# Patient Record
Sex: FEMALE | Race: WHITE | Employment: UNEMPLOYED | URBAN - METROPOLITAN AREA
[De-identification: names, ages, dates, MRNs, and addresses within clinical notes are randomized per-mention and may not be internally consistent; named-entity substitution may affect disease eponyms.]

---

## 2019-03-05 ENCOUNTER — HOSPITAL ENCOUNTER (OUTPATIENT)
Dept: PREADMISSION TESTING | Age: 64
Discharge: HOME | End: 2019-03-09
Payer: MEDICARE

## 2019-03-05 ENCOUNTER — HOSPITAL ENCOUNTER (OUTPATIENT)
Age: 64
Discharge: HOME | End: 2019-03-09
Payer: COMMERCIAL

## 2019-03-05 LAB
ABO/RH: NORMAL
ALBUMIN SERPL-MCNC: 4.4 G/DL (ref 3.4–5)
AMORPHOUS: ABNORMAL /HPF
ANION GAP SERPL CALCULATED.3IONS-SCNC: 16 MMOL/L (ref 3–16)
ANTIBODY SCREEN: NORMAL
APTT: 35.8 SEC (ref 26–36)
BACTERIA: ABNORMAL /HPF
BASOPHILS ABSOLUTE: 0 K/UL (ref 0–0.2)
BASOPHILS RELATIVE PERCENT: 0.4 %
BILIRUBIN URINE: ABNORMAL
BLOOD, URINE: ABNORMAL
BUN BLDV-MCNC: 14 MG/DL (ref 7–20)
CALCIUM SERPL-MCNC: 9.4 MG/DL (ref 8.3–10.6)
CHLORIDE BLD-SCNC: 100 MMOL/L (ref 99–110)
CLARITY: CLEAR
CO2: 25 MMOL/L (ref 21–32)
COLOR: YELLOW
CREAT SERPL-MCNC: 0.8 MG/DL (ref 0.6–1.2)
EOSINOPHILS ABSOLUTE: 0.1 K/UL (ref 0–0.6)
EOSINOPHILS RELATIVE PERCENT: 2.3 %
EPITHELIAL CELLS, UA: ABNORMAL /HPF
GFR AFRICAN AMERICAN: >60
GFR NON-AFRICAN AMERICAN: >60
GLUCOSE BLD-MCNC: 103 MG/DL (ref 70–99)
GLUCOSE URINE: NEGATIVE MG/DL
HCT VFR BLD CALC: 33.7 % (ref 36–48)
HEMOGLOBIN: 11 G/DL (ref 12–16)
INR BLD: 1.02 (ref 0.86–1.14)
KETONES, URINE: NEGATIVE MG/DL
LEUKOCYTE ESTERASE, URINE: ABNORMAL
LYMPHOCYTES ABSOLUTE: 1.2 K/UL (ref 1–5.1)
LYMPHOCYTES RELATIVE PERCENT: 24.4 %
MCH RBC QN AUTO: 31.3 PG (ref 26–34)
MCHC RBC AUTO-ENTMCNC: 32.6 G/DL (ref 31–36)
MCV RBC AUTO: 96.1 FL (ref 80–100)
MICROSCOPIC EXAMINATION: YES
MONOCYTES ABSOLUTE: 0.3 K/UL (ref 0–1.3)
MONOCYTES RELATIVE PERCENT: 6.9 %
NEUTROPHILS ABSOLUTE: 3.3 K/UL (ref 1.7–7.7)
NEUTROPHILS RELATIVE PERCENT: 66 %
NITRITE, URINE: NEGATIVE
PDW BLD-RTO: 17 % (ref 12.4–15.4)
PH UA: 5.5 (ref 5–8)
PLATELET # BLD: 300 K/UL (ref 135–450)
PMV BLD AUTO: 7.3 FL (ref 5–10.5)
POTASSIUM SERPL-SCNC: 3.6 MMOL/L (ref 3.5–5.1)
PROTEIN UA: 100 MG/DL
PROTHROMBIN TIME: 11.6 SEC (ref 9.8–13)
RBC # BLD: 3.5 M/UL (ref 4–5.2)
RBC UA: ABNORMAL /HPF (ref 0–2)
SODIUM BLD-SCNC: 141 MMOL/L (ref 136–145)
SPECIFIC GRAVITY UA: >=1.03 (ref 1–1.03)
URINE TYPE: ABNORMAL
UROBILINOGEN, URINE: 1 E.U./DL
WBC # BLD: 5 K/UL (ref 4–11)
WBC UA: ABNORMAL /HPF (ref 0–5)

## 2019-03-05 PROCEDURE — 86900 BLOOD TYPING SEROLOGIC ABO: CPT

## 2019-03-05 PROCEDURE — 81001 URINALYSIS AUTO W/SCOPE: CPT

## 2019-03-05 PROCEDURE — 86901 BLOOD TYPING SEROLOGIC RH(D): CPT

## 2019-03-05 PROCEDURE — 86850 RBC ANTIBODY SCREEN: CPT

## 2019-03-05 PROCEDURE — 93005 ELECTROCARDIOGRAM TRACING: CPT | Performed by: ORTHOPAEDIC SURGERY

## 2019-03-05 PROCEDURE — 82040 ASSAY OF SERUM ALBUMIN: CPT

## 2019-03-05 PROCEDURE — 87086 URINE CULTURE/COLONY COUNT: CPT

## 2019-03-05 PROCEDURE — 85730 THROMBOPLASTIN TIME PARTIAL: CPT

## 2019-03-05 PROCEDURE — 36415 COLL VENOUS BLD VENIPUNCTURE: CPT

## 2019-03-05 PROCEDURE — 83036 HEMOGLOBIN GLYCOSYLATED A1C: CPT

## 2019-03-05 PROCEDURE — 80048 BASIC METABOLIC PNL TOTAL CA: CPT

## 2019-03-05 PROCEDURE — 85025 COMPLETE CBC W/AUTO DIFF WBC: CPT

## 2019-03-05 PROCEDURE — 85610 PROTHROMBIN TIME: CPT

## 2019-03-06 LAB
EKG ATRIAL RATE: 74 BPM
EKG DIAGNOSIS: NORMAL
EKG P AXIS: 31 DEGREES
EKG P-R INTERVAL: 172 MS
EKG Q-T INTERVAL: 378 MS
EKG QRS DURATION: 90 MS
EKG QTC CALCULATION (BAZETT): 419 MS
EKG R AXIS: 21 DEGREES
EKG T AXIS: 30 DEGREES
EKG VENTRICULAR RATE: 74 BPM
ESTIMATED AVERAGE GLUCOSE: 105.4 MG/DL
HBA1C MFR BLD: 5.3 %

## 2019-03-06 PROCEDURE — 93010 ELECTROCARDIOGRAM REPORT: CPT | Performed by: INTERNAL MEDICINE

## 2019-03-07 LAB — URINE CULTURE, ROUTINE: NORMAL

## 2019-03-18 NOTE — PROGRESS NOTES
Obstructive Sleep Apnea (ANG) Screening     Patient:  Chantal Edge    YOB: 1955      Medical Record #:  [id number]                     Date:  3/18/2019     1. Are you a loud and/or regular snorer? []  Yes       [x] No    2. Have you been observed to gasp or stop breathing during sleep? []  Yes       [x] No    3. Do you feel tired or groggy upon awakening or do you awaken with a headache?           []  Yes       [x] No    4. Are you often tired or fatigued during the wake time hours? []  Yes       [x] No    5. Do you fall asleep sitting, reading, watching TV or driving? []  Yes       [x] No    6. Do you often have problems with memory or concentration? []  Yes       [x] No    **If patient's score is ? 3 they are considered high risk for ANG. Notify the anesthesiologist of the high risk and document in focus note. Note:  If the patient's BMI is more than 35 kg m¯² , has neck circumference > 40 cm, and/or high blood pressure the risk is greater (© American Sleep [employer]nea Association, 2006).

## 2019-03-22 ENCOUNTER — ANESTHESIA EVENT (OUTPATIENT)
Dept: OPERATING ROOM | Age: 64
DRG: 470 | End: 2019-03-22
Payer: COMMERCIAL

## 2019-03-25 ENCOUNTER — APPOINTMENT (OUTPATIENT)
Dept: GENERAL RADIOLOGY | Age: 64
DRG: 470 | End: 2019-03-25
Attending: ORTHOPAEDIC SURGERY
Payer: COMMERCIAL

## 2019-03-25 ENCOUNTER — HOSPITAL ENCOUNTER (INPATIENT)
Age: 64
LOS: 2 days | Discharge: HOME HEALTH | DRG: 470 | End: 2019-03-27
Attending: ORTHOPAEDIC SURGERY | Admitting: ORTHOPAEDIC SURGERY
Payer: COMMERCIAL

## 2019-03-25 ENCOUNTER — ANESTHESIA (OUTPATIENT)
Dept: OPERATING ROOM | Age: 64
DRG: 470 | End: 2019-03-25
Payer: COMMERCIAL

## 2019-03-25 VITALS
RESPIRATION RATE: 14 BRPM | SYSTOLIC BLOOD PRESSURE: 123 MMHG | TEMPERATURE: 97.1 F | DIASTOLIC BLOOD PRESSURE: 77 MMHG | OXYGEN SATURATION: 100 %

## 2019-03-25 DIAGNOSIS — Z98.890 STATUS POST HIP SURGERY: Primary | ICD-10-CM

## 2019-03-25 DIAGNOSIS — M84.451A PATHOLOGIC FRACTURE OF FEMORAL NECK, RIGHT, INITIAL ENCOUNTER (HCC): ICD-10-CM

## 2019-03-25 LAB
ABO/RH: NORMAL
ANTIBODY SCREEN: NORMAL

## 2019-03-25 PROCEDURE — 3600000014 HC SURGERY LEVEL 4 ADDTL 15MIN: Performed by: ORTHOPAEDIC SURGERY

## 2019-03-25 PROCEDURE — C1776 JOINT DEVICE (IMPLANTABLE): HCPCS | Performed by: ORTHOPAEDIC SURGERY

## 2019-03-25 PROCEDURE — 6360000002 HC RX W HCPCS: Performed by: ANESTHESIOLOGY

## 2019-03-25 PROCEDURE — 73501 X-RAY EXAM HIP UNI 1 VIEW: CPT

## 2019-03-25 PROCEDURE — 2720000010 HC SURG SUPPLY STERILE: Performed by: ORTHOPAEDIC SURGERY

## 2019-03-25 PROCEDURE — 86850 RBC ANTIBODY SCREEN: CPT

## 2019-03-25 PROCEDURE — 6360000002 HC RX W HCPCS: Performed by: ORTHOPAEDIC SURGERY

## 2019-03-25 PROCEDURE — 2500000003 HC RX 250 WO HCPCS: Performed by: ORTHOPAEDIC SURGERY

## 2019-03-25 PROCEDURE — 6360000002 HC RX W HCPCS: Performed by: NURSE ANESTHETIST, CERTIFIED REGISTERED

## 2019-03-25 PROCEDURE — 6370000000 HC RX 637 (ALT 250 FOR IP): Performed by: ORTHOPAEDIC SURGERY

## 2019-03-25 PROCEDURE — 88311 DECALCIFY TISSUE: CPT

## 2019-03-25 PROCEDURE — 76942 ECHO GUIDE FOR BIOPSY: CPT | Performed by: ANESTHESIOLOGY

## 2019-03-25 PROCEDURE — 2500000003 HC RX 250 WO HCPCS: Performed by: NURSE ANESTHETIST, CERTIFIED REGISTERED

## 2019-03-25 PROCEDURE — 6370000000 HC RX 637 (ALT 250 FOR IP): Performed by: ANESTHESIOLOGY

## 2019-03-25 PROCEDURE — 2700000000 HC OXYGEN THERAPY PER DAY

## 2019-03-25 PROCEDURE — 2500000003 HC RX 250 WO HCPCS: Performed by: ANESTHESIOLOGY

## 2019-03-25 PROCEDURE — 2580000003 HC RX 258: Performed by: ANESTHESIOLOGY

## 2019-03-25 PROCEDURE — 3700000001 HC ADD 15 MINUTES (ANESTHESIA): Performed by: ORTHOPAEDIC SURGERY

## 2019-03-25 PROCEDURE — 2580000003 HC RX 258: Performed by: ORTHOPAEDIC SURGERY

## 2019-03-25 PROCEDURE — 88305 TISSUE EXAM BY PATHOLOGIST: CPT

## 2019-03-25 PROCEDURE — 3E0T3BZ INTRODUCTION OF ANESTHETIC AGENT INTO PERIPHERAL NERVES AND PLEXI, PERCUTANEOUS APPROACH: ICD-10-PCS | Performed by: ANESTHESIOLOGY

## 2019-03-25 PROCEDURE — 94761 N-INVAS EAR/PLS OXIMETRY MLT: CPT

## 2019-03-25 PROCEDURE — 7100000000 HC PACU RECOVERY - FIRST 15 MIN: Performed by: ORTHOPAEDIC SURGERY

## 2019-03-25 PROCEDURE — 86900 BLOOD TYPING SEROLOGIC ABO: CPT

## 2019-03-25 PROCEDURE — 6370000000 HC RX 637 (ALT 250 FOR IP): Performed by: NURSE PRACTITIONER

## 2019-03-25 PROCEDURE — 2709999900 HC NON-CHARGEABLE SUPPLY: Performed by: ORTHOPAEDIC SURGERY

## 2019-03-25 PROCEDURE — 1200000000 HC SEMI PRIVATE

## 2019-03-25 PROCEDURE — 0SRR019 REPLACEMENT OF RIGHT HIP JOINT, FEMORAL SURFACE WITH METAL SYNTHETIC SUBSTITUTE, CEMENTED, OPEN APPROACH: ICD-10-PCS | Performed by: ORTHOPAEDIC SURGERY

## 2019-03-25 PROCEDURE — 7100000001 HC PACU RECOVERY - ADDTL 15 MIN: Performed by: ORTHOPAEDIC SURGERY

## 2019-03-25 PROCEDURE — 3600000004 HC SURGERY LEVEL 4 BASE: Performed by: ORTHOPAEDIC SURGERY

## 2019-03-25 PROCEDURE — 3700000000 HC ANESTHESIA ATTENDED CARE: Performed by: ORTHOPAEDIC SURGERY

## 2019-03-25 PROCEDURE — 86901 BLOOD TYPING SEROLOGIC RH(D): CPT

## 2019-03-25 PROCEDURE — C1713 ANCHOR/SCREW BN/BN,TIS/BN: HCPCS | Performed by: ORTHOPAEDIC SURGERY

## 2019-03-25 DEVICE — HEAD FEM DIA28MM NK L+0MM HIP CO CHROM CEM PRI PRESSFIT: Type: IMPLANTABLE DEVICE | Site: HIP | Status: FUNCTIONAL

## 2019-03-25 DEVICE — CEMENT BNE 40 GM RADIOPAQUE BA SIMPLEX P: Type: IMPLANTABLE DEVICE | Site: HIP | Status: FUNCTIONAL

## 2019-03-25 DEVICE — VERSYS CEM/REV/CALCAR 13X170MM: Type: IMPLANTABLE DEVICE | Site: HIP | Status: FUNCTIONAL

## 2019-03-25 DEVICE — BIPOLAR LINER 44/45/46MM OD X 28MM ID: Type: IMPLANTABLE DEVICE | Site: HIP | Status: FUNCTIONAL

## 2019-03-25 DEVICE — VERSYS DISTAL CENTRALIZER 11MM: Type: IMPLANTABLE DEVICE | Site: HIP | Status: FUNCTIONAL

## 2019-03-25 DEVICE — BIPOLAR SHELL 44MM OD: Type: IMPLANTABLE DEVICE | Site: HIP | Status: FUNCTIONAL

## 2019-03-25 RX ORDER — LABETALOL HYDROCHLORIDE 5 MG/ML
5 INJECTION, SOLUTION INTRAVENOUS EVERY 10 MIN PRN
Status: DISCONTINUED | OUTPATIENT
Start: 2019-03-25 | End: 2019-03-25 | Stop reason: HOSPADM

## 2019-03-25 RX ORDER — CELECOXIB 100 MG/1
100 CAPSULE ORAL 2 TIMES DAILY
Status: DISCONTINUED | OUTPATIENT
Start: 2019-03-25 | End: 2019-03-27 | Stop reason: HOSPADM

## 2019-03-25 RX ORDER — ACETAMINOPHEN 10 MG/ML
INJECTION, SOLUTION INTRAVENOUS
Status: DISPENSED
Start: 2019-03-25 | End: 2019-03-26

## 2019-03-25 RX ORDER — CELECOXIB 100 MG/1
200 CAPSULE ORAL ONCE
Status: COMPLETED | OUTPATIENT
Start: 2019-03-25 | End: 2019-03-25

## 2019-03-25 RX ORDER — PROMETHAZINE HYDROCHLORIDE 25 MG/ML
6.25 INJECTION, SOLUTION INTRAMUSCULAR; INTRAVENOUS
Status: DISCONTINUED | OUTPATIENT
Start: 2019-03-25 | End: 2019-03-25 | Stop reason: HOSPADM

## 2019-03-25 RX ORDER — OXYCODONE HYDROCHLORIDE AND ACETAMINOPHEN 5; 325 MG/1; MG/1
2 TABLET ORAL EVERY 4 HOURS PRN
Status: DISCONTINUED | OUTPATIENT
Start: 2019-03-25 | End: 2019-03-27 | Stop reason: HOSPADM

## 2019-03-25 RX ORDER — LIDOCAINE HYDROCHLORIDE 10 MG/ML
1 INJECTION, SOLUTION EPIDURAL; INFILTRATION; INTRACAUDAL; PERINEURAL
Status: DISCONTINUED | OUTPATIENT
Start: 2019-03-25 | End: 2019-03-25 | Stop reason: HOSPADM

## 2019-03-25 RX ORDER — SODIUM CHLORIDE, SODIUM LACTATE, POTASSIUM CHLORIDE, CALCIUM CHLORIDE 600; 310; 30; 20 MG/100ML; MG/100ML; MG/100ML; MG/100ML
INJECTION, SOLUTION INTRAVENOUS CONTINUOUS
Status: DISCONTINUED | OUTPATIENT
Start: 2019-03-25 | End: 2019-03-25

## 2019-03-25 RX ORDER — MORPHINE SULFATE 4 MG/ML
1 INJECTION, SOLUTION INTRAMUSCULAR; INTRAVENOUS EVERY 5 MIN PRN
Status: DISCONTINUED | OUTPATIENT
Start: 2019-03-25 | End: 2019-03-25 | Stop reason: HOSPADM

## 2019-03-25 RX ORDER — FENTANYL CITRATE 50 UG/ML
INJECTION, SOLUTION INTRAMUSCULAR; INTRAVENOUS PRN
Status: DISCONTINUED | OUTPATIENT
Start: 2019-03-25 | End: 2019-03-25 | Stop reason: SDUPTHER

## 2019-03-25 RX ORDER — ACETAMINOPHEN 10 MG/ML
INJECTION, SOLUTION INTRAVENOUS CONTINUOUS PRN
Status: COMPLETED | OUTPATIENT
Start: 2019-03-25 | End: 2019-03-25

## 2019-03-25 RX ORDER — ONDANSETRON 2 MG/ML
4 INJECTION INTRAMUSCULAR; INTRAVENOUS
Status: DISCONTINUED | OUTPATIENT
Start: 2019-03-25 | End: 2019-03-25 | Stop reason: HOSPADM

## 2019-03-25 RX ORDER — MIDAZOLAM HYDROCHLORIDE 1 MG/ML
INJECTION INTRAMUSCULAR; INTRAVENOUS PRN
Status: DISCONTINUED | OUTPATIENT
Start: 2019-03-25 | End: 2019-03-25 | Stop reason: SDUPTHER

## 2019-03-25 RX ORDER — ROCURONIUM BROMIDE 10 MG/ML
INJECTION, SOLUTION INTRAVENOUS PRN
Status: DISCONTINUED | OUTPATIENT
Start: 2019-03-25 | End: 2019-03-25 | Stop reason: SDUPTHER

## 2019-03-25 RX ORDER — HYDROMORPHONE HCL 110MG/55ML
PATIENT CONTROLLED ANALGESIA SYRINGE INTRAVENOUS PRN
Status: DISCONTINUED | OUTPATIENT
Start: 2019-03-25 | End: 2019-03-25 | Stop reason: SDUPTHER

## 2019-03-25 RX ORDER — TRANEXAMIC ACID 100 MG/ML
15 INJECTION, SOLUTION INTRAVENOUS ONCE
Status: COMPLETED | OUTPATIENT
Start: 2019-03-25 | End: 2019-03-25

## 2019-03-25 RX ORDER — CYCLOBENZAPRINE HCL 10 MG
10 TABLET ORAL 3 TIMES DAILY PRN
Status: DISCONTINUED | OUTPATIENT
Start: 2019-03-25 | End: 2019-03-27 | Stop reason: HOSPADM

## 2019-03-25 RX ORDER — BUPIVACAINE HYDROCHLORIDE 2.5 MG/ML
INJECTION, SOLUTION EPIDURAL; INFILTRATION; INTRACAUDAL PRN
Status: DISCONTINUED | OUTPATIENT
Start: 2019-03-25 | End: 2019-03-25 | Stop reason: SDUPTHER

## 2019-03-25 RX ORDER — ONDANSETRON 2 MG/ML
INJECTION INTRAMUSCULAR; INTRAVENOUS PRN
Status: DISCONTINUED | OUTPATIENT
Start: 2019-03-25 | End: 2019-03-25 | Stop reason: SDUPTHER

## 2019-03-25 RX ORDER — DEXTROSE, SODIUM CHLORIDE, AND POTASSIUM CHLORIDE 5; .45; .15 G/100ML; G/100ML; G/100ML
1000 INJECTION INTRAVENOUS CONTINUOUS
Status: DISCONTINUED | OUTPATIENT
Start: 2019-03-25 | End: 2019-03-27 | Stop reason: HOSPADM

## 2019-03-25 RX ORDER — ACETAMINOPHEN 500 MG
1000 TABLET ORAL ONCE
Status: DISCONTINUED | OUTPATIENT
Start: 2019-03-25 | End: 2019-03-25 | Stop reason: HOSPADM

## 2019-03-25 RX ORDER — OXYCODONE HYDROCHLORIDE AND ACETAMINOPHEN 5; 325 MG/1; MG/1
1 TABLET ORAL PRN
Status: DISCONTINUED | OUTPATIENT
Start: 2019-03-25 | End: 2019-03-25 | Stop reason: HOSPADM

## 2019-03-25 RX ORDER — HYDRALAZINE HYDROCHLORIDE 20 MG/ML
5 INJECTION INTRAMUSCULAR; INTRAVENOUS EVERY 10 MIN PRN
Status: DISCONTINUED | OUTPATIENT
Start: 2019-03-25 | End: 2019-03-25 | Stop reason: HOSPADM

## 2019-03-25 RX ORDER — DIPHENHYDRAMINE HCL 25 MG
25 TABLET ORAL EVERY 6 HOURS PRN
Status: DISCONTINUED | OUTPATIENT
Start: 2019-03-25 | End: 2019-03-27 | Stop reason: HOSPADM

## 2019-03-25 RX ORDER — SODIUM CHLORIDE 0.9 % (FLUSH) 0.9 %
10 SYRINGE (ML) INJECTION PRN
Status: DISCONTINUED | OUTPATIENT
Start: 2019-03-25 | End: 2019-03-25 | Stop reason: HOSPADM

## 2019-03-25 RX ORDER — CYCLOBENZAPRINE HCL 10 MG
10 TABLET ORAL ONCE
Status: COMPLETED | OUTPATIENT
Start: 2019-03-25 | End: 2019-03-25

## 2019-03-25 RX ORDER — SODIUM CHLORIDE 0.9 % (FLUSH) 0.9 %
10 SYRINGE (ML) INJECTION PRN
Status: DISCONTINUED | OUTPATIENT
Start: 2019-03-25 | End: 2019-03-27 | Stop reason: HOSPADM

## 2019-03-25 RX ORDER — ONDANSETRON 2 MG/ML
4 INJECTION INTRAMUSCULAR; INTRAVENOUS EVERY 6 HOURS PRN
Status: DISCONTINUED | OUTPATIENT
Start: 2019-03-25 | End: 2019-03-27 | Stop reason: HOSPADM

## 2019-03-25 RX ORDER — SODIUM CHLORIDE 0.9 % (FLUSH) 0.9 %
10 SYRINGE (ML) INJECTION EVERY 12 HOURS SCHEDULED
Status: DISCONTINUED | OUTPATIENT
Start: 2019-03-25 | End: 2019-03-27 | Stop reason: HOSPADM

## 2019-03-25 RX ORDER — LIDOCAINE HYDROCHLORIDE 20 MG/ML
INJECTION, SOLUTION INFILTRATION; PERINEURAL PRN
Status: DISCONTINUED | OUTPATIENT
Start: 2019-03-25 | End: 2019-03-25 | Stop reason: SDUPTHER

## 2019-03-25 RX ORDER — DIPHENHYDRAMINE HYDROCHLORIDE 50 MG/ML
12.5 INJECTION INTRAMUSCULAR; INTRAVENOUS
Status: DISCONTINUED | OUTPATIENT
Start: 2019-03-25 | End: 2019-03-25 | Stop reason: HOSPADM

## 2019-03-25 RX ORDER — SODIUM CHLORIDE 0.9 % (FLUSH) 0.9 %
10 SYRINGE (ML) INJECTION EVERY 12 HOURS SCHEDULED
Status: DISCONTINUED | OUTPATIENT
Start: 2019-03-25 | End: 2019-03-25 | Stop reason: HOSPADM

## 2019-03-25 RX ORDER — OXYCODONE HYDROCHLORIDE AND ACETAMINOPHEN 5; 325 MG/1; MG/1
1 TABLET ORAL EVERY 4 HOURS PRN
Status: DISCONTINUED | OUTPATIENT
Start: 2019-03-25 | End: 2019-03-27 | Stop reason: HOSPADM

## 2019-03-25 RX ORDER — PROMETHAZINE HYDROCHLORIDE 12.5 MG/1
12.5 TABLET ORAL
Status: ON HOLD | COMMUNITY
Start: 2018-08-23 | End: 2019-03-27 | Stop reason: HOSPADM

## 2019-03-25 RX ORDER — MORPHINE SULFATE 4 MG/ML
2 INJECTION, SOLUTION INTRAMUSCULAR; INTRAVENOUS EVERY 5 MIN PRN
Status: DISCONTINUED | OUTPATIENT
Start: 2019-03-25 | End: 2019-03-25 | Stop reason: HOSPADM

## 2019-03-25 RX ORDER — MEPERIDINE HYDROCHLORIDE 50 MG/ML
12.5 INJECTION INTRAMUSCULAR; INTRAVENOUS; SUBCUTANEOUS EVERY 5 MIN PRN
Status: DISCONTINUED | OUTPATIENT
Start: 2019-03-25 | End: 2019-03-25 | Stop reason: HOSPADM

## 2019-03-25 RX ORDER — DEXAMETHASONE SODIUM PHOSPHATE 4 MG/ML
INJECTION, SOLUTION INTRA-ARTICULAR; INTRALESIONAL; INTRAMUSCULAR; INTRAVENOUS; SOFT TISSUE PRN
Status: DISCONTINUED | OUTPATIENT
Start: 2019-03-25 | End: 2019-03-25 | Stop reason: SDUPTHER

## 2019-03-25 RX ORDER — DOCUSATE SODIUM 100 MG/1
100 CAPSULE, LIQUID FILLED ORAL 2 TIMES DAILY
Status: DISCONTINUED | OUTPATIENT
Start: 2019-03-25 | End: 2019-03-27 | Stop reason: HOSPADM

## 2019-03-25 RX ORDER — OXYCODONE HYDROCHLORIDE AND ACETAMINOPHEN 5; 325 MG/1; MG/1
2 TABLET ORAL PRN
Status: DISCONTINUED | OUTPATIENT
Start: 2019-03-25 | End: 2019-03-25 | Stop reason: HOSPADM

## 2019-03-25 RX ORDER — PROMETHAZINE HYDROCHLORIDE 12.5 MG/1
12.5 SUPPOSITORY RECTAL
Status: ON HOLD | COMMUNITY
Start: 2018-07-02 | End: 2019-03-27 | Stop reason: HOSPADM

## 2019-03-25 RX ORDER — PROPOFOL 10 MG/ML
INJECTION, EMULSION INTRAVENOUS PRN
Status: DISCONTINUED | OUTPATIENT
Start: 2019-03-25 | End: 2019-03-25 | Stop reason: SDUPTHER

## 2019-03-25 RX ADMIN — ROCURONIUM BROMIDE 50 MG: 10 SOLUTION INTRAVENOUS at 12:38

## 2019-03-25 RX ADMIN — Medication 10 ML: at 20:55

## 2019-03-25 RX ADMIN — TRANEXAMIC ACID 1000 MG: 100 INJECTION, SOLUTION INTRAVENOUS at 12:46

## 2019-03-25 RX ADMIN — ONDANSETRON 4 MG: 2 INJECTION INTRAMUSCULAR; INTRAVENOUS at 13:13

## 2019-03-25 RX ADMIN — HYDROMORPHONE HYDROCHLORIDE 0.25 MG: 1 INJECTION, SOLUTION INTRAMUSCULAR; INTRAVENOUS; SUBCUTANEOUS at 14:38

## 2019-03-25 RX ADMIN — DEXAMETHASONE SODIUM PHOSPHATE 8 MG: 4 INJECTION, SOLUTION INTRAMUSCULAR; INTRAVENOUS at 13:13

## 2019-03-25 RX ADMIN — LIDOCAINE HYDROCHLORIDE 60 MG: 20 INJECTION, SOLUTION INFILTRATION; PERINEURAL at 12:38

## 2019-03-25 RX ADMIN — HYDROMORPHONE HYDROCHLORIDE 1 MG: 2 INJECTION INTRAMUSCULAR; INTRAVENOUS; SUBCUTANEOUS at 13:07

## 2019-03-25 RX ADMIN — SODIUM CHLORIDE, POTASSIUM CHLORIDE, SODIUM LACTATE AND CALCIUM CHLORIDE: 600; 310; 30; 20 INJECTION, SOLUTION INTRAVENOUS at 11:54

## 2019-03-25 RX ADMIN — CELECOXIB 200 MG: 100 CAPSULE ORAL at 11:53

## 2019-03-25 RX ADMIN — Medication 2 G: at 20:40

## 2019-03-25 RX ADMIN — SODIUM CHLORIDE, POTASSIUM CHLORIDE, SODIUM LACTATE AND CALCIUM CHLORIDE: 600; 310; 30; 20 INJECTION, SOLUTION INTRAVENOUS at 13:08

## 2019-03-25 RX ADMIN — Medication 2 G: at 12:46

## 2019-03-25 RX ADMIN — PROPOFOL 180 MG: 10 INJECTION, EMULSION INTRAVENOUS at 12:38

## 2019-03-25 RX ADMIN — DIPHENHYDRAMINE HCL 25 MG: 25 TABLET ORAL at 20:53

## 2019-03-25 RX ADMIN — BUPIVACAINE HYDROCHLORIDE 30 ML: 2.5 INJECTION, SOLUTION EPIDURAL; INFILTRATION; INTRACAUDAL; PERINEURAL at 12:07

## 2019-03-25 RX ADMIN — ONDANSETRON 4 MG: 2 INJECTION INTRAMUSCULAR; INTRAVENOUS at 13:59

## 2019-03-25 RX ADMIN — MIDAZOLAM HYDROCHLORIDE 4 MG: 2 INJECTION, SOLUTION INTRAMUSCULAR; INTRAVENOUS at 12:07

## 2019-03-25 RX ADMIN — SUGAMMADEX 200 MG: 100 INJECTION, SOLUTION INTRAVENOUS at 13:59

## 2019-03-25 RX ADMIN — HYDROMORPHONE HYDROCHLORIDE 0.5 MG: 1 INJECTION, SOLUTION INTRAMUSCULAR; INTRAVENOUS; SUBCUTANEOUS at 15:49

## 2019-03-25 RX ADMIN — CYCLOBENZAPRINE HYDROCHLORIDE 10 MG: 10 TABLET, FILM COATED ORAL at 11:53

## 2019-03-25 RX ADMIN — FENTANYL CITRATE 100 MCG: 50 INJECTION INTRAMUSCULAR; INTRAVENOUS at 12:38

## 2019-03-25 RX ADMIN — DOCUSATE SODIUM 100 MG: 100 CAPSULE, LIQUID FILLED ORAL at 20:42

## 2019-03-25 ASSESSMENT — PULMONARY FUNCTION TESTS
PIF_VALUE: 20
PIF_VALUE: 3
PIF_VALUE: 18
PIF_VALUE: 20
PIF_VALUE: 19
PIF_VALUE: 0
PIF_VALUE: 20
PIF_VALUE: 19
PIF_VALUE: 20
PIF_VALUE: 21
PIF_VALUE: 20
PIF_VALUE: 20
PIF_VALUE: 19
PIF_VALUE: 3
PIF_VALUE: 20
PIF_VALUE: 19
PIF_VALUE: 17
PIF_VALUE: 20
PIF_VALUE: 19
PIF_VALUE: 19
PIF_VALUE: 20
PIF_VALUE: 19
PIF_VALUE: 20
PIF_VALUE: 4
PIF_VALUE: 20
PIF_VALUE: 0
PIF_VALUE: 20
PIF_VALUE: 20
PIF_VALUE: 19
PIF_VALUE: 19
PIF_VALUE: 0
PIF_VALUE: 20
PIF_VALUE: 0
PIF_VALUE: 20
PIF_VALUE: 0
PIF_VALUE: 20
PIF_VALUE: 20
PIF_VALUE: 19
PIF_VALUE: 20
PIF_VALUE: 19
PIF_VALUE: 20
PIF_VALUE: 21
PIF_VALUE: 20
PIF_VALUE: 19
PIF_VALUE: 20
PIF_VALUE: 19
PIF_VALUE: 2
PIF_VALUE: 20
PIF_VALUE: 18
PIF_VALUE: 20
PIF_VALUE: 21
PIF_VALUE: 20
PIF_VALUE: 19
PIF_VALUE: 19
PIF_VALUE: 20
PIF_VALUE: 0
PIF_VALUE: 20
PIF_VALUE: 19
PIF_VALUE: 19
PIF_VALUE: 20
PIF_VALUE: 22
PIF_VALUE: 20
PIF_VALUE: 19
PIF_VALUE: 20
PIF_VALUE: 20
PIF_VALUE: 0
PIF_VALUE: 0
PIF_VALUE: 20
PIF_VALUE: 20
PIF_VALUE: 0
PIF_VALUE: 25
PIF_VALUE: 20
PIF_VALUE: 1

## 2019-03-25 ASSESSMENT — PAIN DESCRIPTION - PAIN TYPE
TYPE: SURGICAL PAIN
TYPE: SURGICAL PAIN

## 2019-03-25 ASSESSMENT — PAIN DESCRIPTION - LOCATION
LOCATION: HIP
LOCATION: HIP

## 2019-03-25 ASSESSMENT — PAIN SCALES - GENERAL
PAINLEVEL_OUTOF10: 2
PAINLEVEL_OUTOF10: 3
PAINLEVEL_OUTOF10: 7
PAINLEVEL_OUTOF10: 2
PAINLEVEL_OUTOF10: 6

## 2019-03-25 ASSESSMENT — PAIN DESCRIPTION - FREQUENCY: FREQUENCY: INTERMITTENT

## 2019-03-25 ASSESSMENT — PAIN DESCRIPTION - ORIENTATION
ORIENTATION: RIGHT
ORIENTATION: RIGHT

## 2019-03-25 ASSESSMENT — PAIN DESCRIPTION - DESCRIPTORS
DESCRIPTORS: ACHING;DISCOMFORT
DESCRIPTORS: ACHING

## 2019-03-25 ASSESSMENT — PAIN - FUNCTIONAL ASSESSMENT: PAIN_FUNCTIONAL_ASSESSMENT: 0-10

## 2019-03-25 NOTE — H&P
Department of Orthopedic Surgery  Attending   History and Physical        CHIEF COMPLAINT:  Right pending pathologic hip fracture    Reason for Admission: As Above    History Obtained From:  patient    HISTORY OF PRESENT ILLNESS:      The patient is a 61 y.o. female  who presents with right pathologic hip fracture       Past Medical History:        Diagnosis Date    Cancer ([facility] [address] 08/2016    ENDOMETRIAL    Hypertension     DURING ONE CHEMO [place]     Past Surgical History:        Procedure Laterality Date    HYSTERECTOMY      KIDNEY SURGERY Right     STENT FROM CA    OTHER SURGICAL HISTORY Right     ureteral stent change         Medications Prior to Admission:   Prior to Admission medications    Medication Sig Start Date End Date Taking? Authorizing Provider   promethazine (PHENERGAN) 12.5 MG suppository Place 12.5 mg rectally 7/2/18  Yes Historical Provider, MD   promethazine (PHENERGAN) 12.5 MG tablet Take 12.5 mg by mouth 8/23/18  Yes Historical Provider, MD   LORazepam (ATIVAN) 0.5 MG tablet Take 0.5 mg by mouth every 6 hours as needed for Anxiety. Yes Historical Provider, MD   NONFORMULARY daily Vitamin B complex patch   Yes Historical Provider, MD   NONFORMULARY daily   Yes Historical Provider, MD       Allergies:  Patient has no known allergies. Social History:    Tobacco:  reports that she has never smoked. She has never used smokeless tobacco.   Alcohol:  reports that she drinks about 0.6 oz of alcohol per week. Illicit Drug: No  Family History:   History reviewed. No pertinent family history.   REVIEW OF SYSTEMS:  CONSTITUTIONAL:  negative  MUSCULOSKELETAL:  positive for  pain    PHYSICAL EXAM:  Admission weight: 185 lb (83.9 kg)  5' 5\" (165.1 cm)  VITALS:  BP (!) 167/108   Pulse 113   Temp 97.2 °F (36.2 °C) (Temporal)   Resp 16   Ht 5' 5\" (1.651 m)   Wt 185 lb (83.9 kg)   SpO2 97%   BMI 30.79 kg/m²     CONSTITUTIONAL:  awake, alert, cooperative, no apparent distress, and appears stated age    MUSCULOSKELETAL:  there is no redness, warmth, or swelling of the joints  full range of motion noted  motor strength is 5 out of 5 all extremities bilaterally  tone is normal  with exception of  RIGHT HIP:  Reduced ROM and strength because of pain  Cor RRR  Lungs clear    DATA:  CBC:   Lab Results   Component Value Date    WBC 5.0 03/05/2019    RBC 3.50 03/05/2019    HGB 11.0 03/05/2019    HCT 33.7 03/05/2019    MCV 96.1 03/05/2019    MCH 31.3 03/05/2019    MCHC 32.6 03/05/2019    RDW 17.0 03/05/2019     03/05/2019    MPV 7.3 03/05/2019     BMP:    Lab Results   Component Value Date     03/05/2019    K 3.6 03/05/2019     03/05/2019    CO2 25 03/05/2019    BUN 14 03/05/2019    LABALBU 4.4 03/05/2019    CREATININE 0.8 03/05/2019    CALCIUM 9.4 03/05/2019    GFRAA >60 03/05/2019    LABGLOM >60 03/05/2019    GLUCOSE 103 03/05/2019     PT/INR:    Lab Results   Component Value Date    PROTIME 11.6 03/05/2019    INR 1.02 03/05/2019       Radiology:  No orders to display         ASSESSMENT: right hip pending fracture    PLAN:  1)  Right bipolar hip      Viridiana Lawton

## 2019-03-26 LAB
ANION GAP SERPL CALCULATED.3IONS-SCNC: 12 MMOL/L (ref 3–16)
BASOPHILS ABSOLUTE: 0 K/UL (ref 0–0.2)
BASOPHILS RELATIVE PERCENT: 0.3 %
BUN BLDV-MCNC: 13 MG/DL (ref 7–20)
CALCIUM SERPL-MCNC: 9.1 MG/DL (ref 8.3–10.6)
CHLORIDE BLD-SCNC: 101 MMOL/L (ref 99–110)
CO2: 27 MMOL/L (ref 21–32)
CREAT SERPL-MCNC: 0.9 MG/DL (ref 0.6–1.2)
EOSINOPHILS ABSOLUTE: 0 K/UL (ref 0–0.6)
EOSINOPHILS RELATIVE PERCENT: 0 %
GFR AFRICAN AMERICAN: >60
GFR NON-AFRICAN AMERICAN: >60
GLUCOSE BLD-MCNC: 132 MG/DL (ref 70–99)
HCT VFR BLD CALC: 30.1 % (ref 36–48)
HEMOGLOBIN: 10 G/DL (ref 12–16)
LYMPHOCYTES ABSOLUTE: 0.9 K/UL (ref 1–5.1)
LYMPHOCYTES RELATIVE PERCENT: 10.5 %
MCH RBC QN AUTO: 31.5 PG (ref 26–34)
MCHC RBC AUTO-ENTMCNC: 33.1 G/DL (ref 31–36)
MCV RBC AUTO: 95 FL (ref 80–100)
MONOCYTES ABSOLUTE: 0.6 K/UL (ref 0–1.3)
MONOCYTES RELATIVE PERCENT: 7.5 %
NEUTROPHILS ABSOLUTE: 6.9 K/UL (ref 1.7–7.7)
NEUTROPHILS RELATIVE PERCENT: 81.7 %
PDW BLD-RTO: 16.4 % (ref 12.4–15.4)
PLATELET # BLD: 262 K/UL (ref 135–450)
PMV BLD AUTO: 7.3 FL (ref 5–10.5)
POTASSIUM REFLEX MAGNESIUM: 4.2 MMOL/L (ref 3.5–5.1)
RBC # BLD: 3.16 M/UL (ref 4–5.2)
SODIUM BLD-SCNC: 140 MMOL/L (ref 136–145)
WBC # BLD: 8.5 K/UL (ref 4–11)

## 2019-03-26 PROCEDURE — 97530 THERAPEUTIC ACTIVITIES: CPT

## 2019-03-26 PROCEDURE — 6370000000 HC RX 637 (ALT 250 FOR IP): Performed by: ORTHOPAEDIC SURGERY

## 2019-03-26 PROCEDURE — 6370000000 HC RX 637 (ALT 250 FOR IP): Performed by: NURSE PRACTITIONER

## 2019-03-26 PROCEDURE — 6360000002 HC RX W HCPCS: Performed by: ORTHOPAEDIC SURGERY

## 2019-03-26 PROCEDURE — 1200000000 HC SEMI PRIVATE

## 2019-03-26 PROCEDURE — 36415 COLL VENOUS BLD VENIPUNCTURE: CPT

## 2019-03-26 PROCEDURE — 97110 THERAPEUTIC EXERCISES: CPT

## 2019-03-26 PROCEDURE — 97535 SELF CARE MNGMENT TRAINING: CPT

## 2019-03-26 PROCEDURE — 80048 BASIC METABOLIC PNL TOTAL CA: CPT

## 2019-03-26 PROCEDURE — 97161 PT EVAL LOW COMPLEX 20 MIN: CPT

## 2019-03-26 PROCEDURE — 2580000003 HC RX 258: Performed by: ORTHOPAEDIC SURGERY

## 2019-03-26 PROCEDURE — 6370000000 HC RX 637 (ALT 250 FOR IP): Performed by: ANESTHESIOLOGY

## 2019-03-26 PROCEDURE — 97116 GAIT TRAINING THERAPY: CPT

## 2019-03-26 PROCEDURE — 85025 COMPLETE CBC W/AUTO DIFF WBC: CPT

## 2019-03-26 PROCEDURE — 97165 OT EVAL LOW COMPLEX 30 MIN: CPT

## 2019-03-26 PROCEDURE — APPSS15 APP SPLIT SHARED TIME 0-15 MINUTES: Performed by: PHYSICIAN ASSISTANT

## 2019-03-26 RX ORDER — DIPHENHYDRAMINE HCL 25 MG
25 TABLET ORAL EVERY 6 HOURS PRN
Refills: 0 | COMMUNITY
Start: 2019-03-26 | End: 2019-04-25

## 2019-03-26 RX ORDER — ACETAMINOPHEN 325 MG/1
650 TABLET ORAL EVERY 4 HOURS PRN
Status: DISCONTINUED | OUTPATIENT
Start: 2019-03-26 | End: 2019-03-27 | Stop reason: HOSPADM

## 2019-03-26 RX ORDER — OXYCODONE HYDROCHLORIDE AND ACETAMINOPHEN 5; 325 MG/1; MG/1
1 TABLET ORAL EVERY 4 HOURS PRN
Qty: 12 TABLET | Refills: 0 | Status: SHIPPED | OUTPATIENT
Start: 2019-03-26 | End: 2019-03-29

## 2019-03-26 RX ADMIN — Medication 10 ML: at 20:39

## 2019-03-26 RX ADMIN — CELECOXIB 100 MG: 100 CAPSULE ORAL at 08:29

## 2019-03-26 RX ADMIN — OXYCODONE HYDROCHLORIDE AND ACETAMINOPHEN 1 TABLET: 5; 325 TABLET ORAL at 14:22

## 2019-03-26 RX ADMIN — ENOXAPARIN SODIUM 40 MG: 40 INJECTION SUBCUTANEOUS at 08:29

## 2019-03-26 RX ADMIN — Medication 10 ML: at 08:30

## 2019-03-26 RX ADMIN — ONDANSETRON 4 MG: 2 INJECTION INTRAMUSCULAR; INTRAVENOUS at 18:31

## 2019-03-26 RX ADMIN — DIPHENHYDRAMINE HCL 25 MG: 25 TABLET ORAL at 22:57

## 2019-03-26 RX ADMIN — Medication 2 G: at 04:52

## 2019-03-26 RX ADMIN — OXYCODONE HYDROCHLORIDE AND ACETAMINOPHEN 1 TABLET: 5; 325 TABLET ORAL at 09:57

## 2019-03-26 RX ADMIN — DOCUSATE SODIUM 100 MG: 100 CAPSULE, LIQUID FILLED ORAL at 08:29

## 2019-03-26 RX ADMIN — CELECOXIB 100 MG: 100 CAPSULE ORAL at 20:39

## 2019-03-26 RX ADMIN — ONDANSETRON 4 MG: 2 INJECTION INTRAMUSCULAR; INTRAVENOUS at 09:58

## 2019-03-26 RX ADMIN — OXYCODONE HYDROCHLORIDE AND ACETAMINOPHEN 1 TABLET: 5; 325 TABLET ORAL at 18:33

## 2019-03-26 RX ADMIN — CELECOXIB 100 MG: 100 CAPSULE ORAL at 00:55

## 2019-03-26 ASSESSMENT — PAIN SCALES - GENERAL
PAINLEVEL_OUTOF10: 3
PAINLEVEL_OUTOF10: 5
PAINLEVEL_OUTOF10: 0
PAINLEVEL_OUTOF10: 5
PAINLEVEL_OUTOF10: 4
PAINLEVEL_OUTOF10: 0
PAINLEVEL_OUTOF10: 1
PAINLEVEL_OUTOF10: 3

## 2019-03-26 ASSESSMENT — PAIN DESCRIPTION - ORIENTATION: ORIENTATION: RIGHT

## 2019-03-26 ASSESSMENT — PAIN SCALES - WONG BAKER: WONGBAKER_NUMERICALRESPONSE: 4

## 2019-03-26 ASSESSMENT — PAIN DESCRIPTION - DESCRIPTORS: DESCRIPTORS: ACHING

## 2019-03-26 ASSESSMENT — PAIN DESCRIPTION - FREQUENCY: FREQUENCY: CONTINUOUS

## 2019-03-26 ASSESSMENT — PAIN DESCRIPTION - PAIN TYPE: TYPE: SURGICAL PAIN

## 2019-03-26 ASSESSMENT — PAIN DESCRIPTION - LOCATION: LOCATION: HIP

## 2019-03-26 NOTE — PLAN OF CARE
Problem: Falls - Risk of:  Goal: Will remain free from falls  Description  Will remain free from falls  Outcome: Ongoing  Note:   Bed in lowest position, wheels locked, 2/4 side rails up, nonskid footwear on. Bed/ chair check alarm in place, call light within reach. Pt instructed to call out when needing assistance. Pt stated understanding. Nurse will continue to monitor.

## 2019-03-26 NOTE — PROGRESS NOTES
Occupational Therapy   Occupational Therapy Initial Assessment/Treatment  Date: 3/26/2019   Patient Name: César Bautista  MRN: [id number]     : 1955    Date of Service: 3/26/2019    Discharge Recommendations:  Home with assist PRN       Assessment   Performance deficits / Impairments: Decreased functional mobility ; Decreased endurance;Decreased ADL status; Decreased balance  After evaluation POD #1 s/p R bipolar hip replacement, pt found to be presenting with the above mentioned occupational performance deficits which are affecting participation in daily living skills. Pt. Christine for LE dressing, Trista for toilet transfers with CGA progressing to SBA with functional mobility with SW. Pt would benefit from continued skilled occupational therapy to address ADLs, functional mobility, and safety while in acute care. Prognosis: Good  Decision Making: Low Complexity  Patient Education: ADLs, bed mobility, functional transfers and role of OT  REQUIRES OT FOLLOW UP: Yes  Activity Tolerance  Activity Tolerance: Patient Tolerated treatment well  Safety Devices  Safety Devices in place: Yes  Type of devices: Left in chair;Call light within reach;Gait belt;Nurse notified; Chair alarm in place           Patient Diagnosis(es): The encounter diagnosis was Pathologic fracture of femoral neck, right, initial encounter ([facility] [address] has a past medical history of Cancer ([facility] [address] and Hypertension. has a past surgical history that includes Hysterectomy; Kidney surgery (Right); other surgical history (Right); and HEMIARTHROPLASTY HIP (Right, 3/25/2019).            Restrictions  Restrictions/Precautions  Restrictions/Precautions: Weight Bearing, Fall Risk, ROM Restrictions  Lower Extremity Weight Bearing Restrictions  Right Lower Extremity Weight Bearing: Weight Bearing As Tolerated  Position Activity Restriction  Hip Precautions: No ADduction, No hip extension, No hip external rotation    Subjective   General  Chart Reviewed: Yes  Patient assessed for rehabilitation services?: Yes  Family / Caregiver Present: Yes(daughter)  Referring Practitioner: Richard Michaels MD 3/25/19  Diagnosis: R hip pathological fx, s/p R anterior THR 3/25/19  Subjective  Subjective: Pt. up in chair upon entry, pleasant and agreeable to OT evaluation, states would prefer to stay over one more night.   Pain Assessment  Pain Assessment: 0-10  Pain Level: 1  Patient's Stated Pain Goal: 6  Pain Type: Surgical pain  Pain Location: Hip  Pain Orientation: Right  Pain Descriptors: Aching  Pain Frequency: Continuous  Non-Pharmaceutical Pain Intervention(s): Ambulation/Increased Activity, Cold applied, Emotional support, Repositioned     Social/Functional History  Social/Functional History  Lives With: Spouse()  Type of Home: House  Home Layout: Two level, Able to Live on Main level with bedroom/bathroom  Home Access: Stairs to enter with rails(2 LYUDMILA)  Entrance Stairs - Rails: Right(ascending)  Bathroom Shower/Tub: Walk-in shower  Bathroom Toilet: Standard(master bath; comfort height in dueñas bath)  Bathroom Equipment: (no bathroom equipment)  Home Equipment: Cane, 4 wheeled walker  Receives Help From: Family, Other (comment)(family assists with homemaking, receives assist with home management throughout the week from [email or web address] girls)  ADL Assistance: Independent  Homemaking Assistance: Needs assistance( doing majority of homemaking for pt since CA dx)  Ambulation Assistance: Independent(using 4WW or cane for the last year due to pending pathologic hip fx)  Transfer Assistance: Independent  Active : Yes       Objective        Orientation  Overall Orientation Status: Within Functional Limits  Observation/Palpation  Posture: Good     Balance  Sitting Balance: Supervision  Standing Balance: Stand by assistance(with RW during LE clothing mgmt)  Standing Balance  Sit to stand: Stand by assistance(up SW)  Stand to sit: Stand by assistance    Functional Mobility  Functional - Mobility Device: Standard Walker  Activity: To/from bathroom  Assist Level: Contact guard assistance(progressing to SBA)    Toilet Transfers  Toilet - Technique: Ambulating(with SW)  Equipment Used: Standard toilet(with BSC over top w/ grab bars)  Toilet Transfer: Modified independent    ADL  UE Dressing: Independent(april castillo)  LE Dressing: Minimal assistance(from daughter)  Toileting: Supervision     Tone RUE  RUE Tone: Normotonic  Tone LUE  LUE Tone: Normotonic  Coordination  Movements Are Fluid And Coordinated: Yes     Bed mobility  Comment: Pt. up in chair pre and post [facility] bed mobility.   Transfers  Sit to stand: Stand by assistance(up SW)  Stand to sit: Stand by assistance     Cognition  Overall Cognitive Status: WFL     LUE AROM (degrees)  LUE AROM : WFL  RUE AROM (degrees)  RUE AROM : WFL  LUE Strength  Gross LUE Strength: WFL  RUE Strength  Gross RUE Strength: WFL      Plan   Plan  Times per week: 4-6x's a week while in acute care    AM-PAC Score        AM-[facility] Inpatient Daily Activity Raw Score: 20  AM-PAC Inpatient ADL T-Scale Score : 42.03  ADL Inpatient CMS 0-100% Score: 38.32  ADL Inpatient CMS G-Code Modifier : CJ    Goals  Short term goals  Time Frame for Short term goals: 1 week unless otherwise specified  Short term goal 1: Pt. will verbalize understanding of safe car transfer after education by 3/27  Short term goal 2: Pt. will complete LE dressing with SBA  Patient Goals   Patient goals : \"to get back to my normal routine\"       Therapy Time   Individual Concurrent Group Co-treatment   Time In 1100         Time Out 1140         Minutes 40         Timed Code Treatment Minutes: 30 Minutes       Brenna Ahn OTR/L

## 2019-03-26 NOTE — PROGRESS NOTES
Physical Therapy  Facility/Department: [facility] C5 - MED SURG/ORTHO  Daily Treatment Note  NAME: Maricel Zurita  : 1955  MRN: [id number]    Date of Service: 3/26/2019    Discharge Recommendations:  Home with assist PRN, Outpatient PT   PT Equipment Recommendations  Equipment Needed: Yes  Mobility Devices: Lorene Marin: Standard    Patient Diagnosis(es): The primary encounter diagnosis was Status post hip surgery. A diagnosis of Pathologic fracture of femoral neck, right, initial encounter [facility]) was also pertinent to this visit. has a past medical history of Cancer ([facility] [address] and Hypertension. has a past surgical history that includes Hysterectomy; Kidney surgery (Right); other surgical history (Right); and HEMIARTHROPLASTY HIP (Right, 3/25/2019). Restrictions  Restrictions/Precautions  Restrictions/Precautions: Weight Bearing, Fall Risk, ROM Restrictions  Lower Extremity Weight Bearing Restrictions  Right Lower Extremity Weight Bearing: Weight Bearing As Tolerated  Position Activity Restriction  Hip Precautions: No ADduction, No hip extension, No hip external rotation  Subjective   General  Chart Reviewed:  Yes  Additional Pertinent Hx: Endometrial CA (currently on a break from chemo)  Family / Caregiver Present: Yes(2 dtrs)  Referring Practitioner: Dr. Mike Finley: Pt agreeable to work with PT  General Comment  Comments: Pt sitting up in chair upon entry of therapy staff  Pain Screening  Patient Currently in Pain: Yes  Pain Assessment  Pain Assessment: Faces  Sadler-Baker Pain Rating: Hurts little more  Non-Pharmaceutical Pain Intervention(s): Ambulation/Increased Activity;Repositioned  Vital Signs  Patient Currently in Pain: Yes       Orientation  Orientation  Overall Orientation Status: Within Normal Limits  Cognition      Objective   Bed mobility  Sit to Supine: Minimal assistance  Transfers  Sit to Stand: Stand by assistance  Stand to sit: Supervision  Ambulation  Ambulation?: Yes  WB Status: WBAT RLE  Ambulation 1  Surface: level tile  Device: Standard Walker  Assistance: Stand by assistance  Quality of Gait: slow amparo using step-to gait pattern. Min cues needed for proper gait sequencing and stride with walker. Safe and steady without LOB. Distance: 100 feet  Stairs/Curb  Stairs?: No(Pt states prefering to practice steps tomorrow)     Balance  Posture: Good  Sitting - Static: Good  Sitting - Dynamic: Good  Standing - Static: Good  Standing - Dynamic: Good;-  Exercises  Quad Sets: 12 B  Heelslides: 12 B  Gluteal Sets: 10  Knee Long Arc Quad: 12 R  Ankle Pumps: x 20 BLE       Assessment   Body structures, Functions, Activity limitations: Decreased functional mobility ; Decreased ROM; Decreased strength;Decreased endurance  Assessment: Pt ambulating 100 feet in hallway with std. walker and SBA x 1. Safe and steady with good standing balance during mobility. Anticipate no barriers to pt discharging directly home from hospital -- once cleared by ortho and once she can safely navigate steps. Recommend std. walker for home use. Treatment Diagnosis: Decreased ROM/strength RLE and (I) with mobility s/p R hip bipolar hemiarthroplasty  Prognosis: Good  Patient Education: Role of PT, benefits of mobility, safety in transfers/amb with std. walker, WBAT, RLE ther ex, rehab progression while in hospital - pt verbalizes understanding  Activity Tolerance  Activity Tolerance: Patient Tolerated treatment well       AM-PAC Score  AM-PAC Inpatient Mobility Raw Score : 21  AM-PAC Inpatient T-Scale Score : 50.25  Mobility Inpatient CMS 0-100% Score: 28.97  Mobility Inpatient CMS G-Code Modifier : CJ          Goals  Short term goals  Time Frame for Short term goals: 1-2 days (unless otherwise specified)  Short term goal 1: Pt will transfer supine <-> sit with modified(I)  Short term goal 2: Pt will transfer sit <-> stand and bed>chair using std.  walker with modified(I)  Short term goal 3: Pt will ambulate x 150 feet using std. walker with supervision  Short term goal 4: By 3/28/19: Pt will tolerate 12-15 reps BLE exercise for ROM/strengthening  Short term goal 5: Pt will ambulate up/down 2 steps using 1 handrail (using additional AD as needed) with CGA/SBA x 1  Patient Goals   Patient goals : \"To go home\"    Plan    Plan  Times per week: BID 7x/week  Times per day: Twice a day  Specific instructions for Next Treatment: Progress ther ex and mobility as tolerated, assess stair amb prior to D/C home  Current Treatment Recommendations: Strengthening, Gait Training, Functional Mobility Training, Transfer Training, Safety Education & Training, Patient/Caregiver Education & Training, Equipment Evaluation, Education, & procurement, Positioning, Home Exercise Program, ROM, Stair training  Safety Devices  Type of devices:  All fall risk precautions in place, Call light within reach, Nurse notified, Gait belt, Bed alarm in place, Left in bed     Therapy Time   Individual Concurrent Group Co-treatment   Time In 1500         Time Out 1538         Minutes 38         Timed Code Treatment Minutes: [address]

## 2019-03-26 NOTE — PROGRESS NOTES
Physical Therapy    Facility/Department: [place] [address] - MED SURG/ORTHO  Initial Assessment    NAME: Chriss Hines  : 1955  MRN: [id number]    Date of Service: 3/26/2019    Discharge Recommendations:  Home with assist PRN, Outpatient PT(eventual OP PT per ortho recs)   PT Equipment Recommendations  Equipment Needed: Yes  Mobility Devices: Therman Minder: Standard    Assessment   Body structures, Functions, Activity limitations: Decreased functional mobility ; Decreased ROM; Decreased strength;Decreased endurance  Assessment: Pt referred for PT evaluation during current hospital stay with dx of R hip pending pathologic fx, s/p R hip bipolar hemiarthroplasty on 3/25/19. Pt currently functioning quite well on POD #1, ambulating ~75 feet in hallway with std. walker and CGA x 1, quickly decreasing to SBA/supervision with repetition. Safe and steady with good standing balance during mobility. Anticipate no barriers to pt discharging directly home from hospital -- once cleared by ortho and once she can safely navigate steps. Recommend std. walker for home use. Treatment Diagnosis: Decreased ROM/strength RLE and (I) with mobility s/p R hip bipolar hemiarthroplasty  Specific instructions for Next Treatment: Progress ther ex and mobility as tolerated, assess stair amb prior to D/C home  Prognosis: Good  Decision Making: Low Complexity  Patient Education: Role of PT, benefits of mobility, safety in transfers/amb with std. walker, WBAT, RLE ther ex, rehab progression while in hospital - pt verbalizes understanding  REQUIRES PT FOLLOW UP: Yes  Activity Tolerance: Patient Tolerated treatment well  Activity Tolerance: BP immediately post-amb = 111/70. Pt without c/o dizziness. Patient Diagnosis(es): The encounter diagnosis was Pathologic fracture of femoral neck, right, initial encounter ([facility] [address] has a past medical history of Cancer ([facility] [address] and Hypertension.    has a past surgical history that includes Hysterectomy; Kidney surgery (Right); other surgical history (Right); and HEMIARTHROPLASTY HIP (Right, 3/25/2019). Restrictions  Restrictions/Precautions  Restrictions/Precautions: Weight Bearing, Fall Risk, ROM Restrictions  Lower Extremity Weight Bearing Restrictions  Right Lower Extremity Weight Bearing: Weight Bearing As Tolerated  Position Activity Restriction  Hip Precautions: No ADduction, No hip extension, No hip external rotation     Vision/Hearing  Vision: Within Functional Limits  Vision Exceptions: Wears glasses at all times  Hearing: Within functional limits       Subjective  General  Chart Reviewed: Yes  Patient assessed for rehabilitation services?: Yes  Additional Pertinent Hx: Endometrial CA (currently on a break from chemo)  Family / Caregiver Present: Yes(daughter)  Referring Practitioner: Dr. Comfort Flores  Referral Date : 03/25/19  Diagnosis: Pending R pathologic hip fx, s/p R bipolar hip hemiarthroplasty on 3/25/19  Follows Commands: Within Functional Limits  General Comment  Comments: Pt sitting up in chair upon entry of therapy staff  Subjective  Subjective: Pt agreeable to work with PT/OT this morning. States she's feeling better now that she took a pain pill. Planning to stay in the hospital another night. Pain Screening  Patient Currently in Pain: Yes  Pain Assessment  Pain Assessment: 0-10  Pain Level: 1  Pain Type: Surgical pain  Pain Location: Hip  Pain Orientation: Right  Pain Descriptors: Aching  Pain Frequency: Continuous  Non-Pharmaceutical Pain Intervention(s): Ambulation/Increased Activity;Cold applied; Emotional support;Repositioned  Intervention List: Patient able to continue with treatment    Orientation  Orientation  Overall Orientation Status: Within Normal Limits     Social/Functional History  Social/Functional History  Lives With: Spouse()  Type of Home: House  Home Layout: Two level, Able to Live on Main level with bedroom/bathroom  Home Access: Stairs to enter with rails(2 LYUDMILA)  Entrance Stairs - Rails: Right(ascending)  Bathroom Shower/Tub: Walk-in shower  Bathroom Toilet: Standard(master bath; comfort height in dueñas bath)  Bathroom Equipment: (no bathroom equipment)  Home Equipment: Cane, 4 wheeled walker  Receives Help From: Family, Other (comment)(family assists with homemaking, receives assist with home management throughout the week from [employer] girls)  ADL Assistance: Independent  Homemaking Assistance: Needs assistance( doing majority of homemaking for pt since CA dx)  Ambulation Assistance: Independent(using 4WW or cane for the last year due to pending pathologic hip fx)  Transfer Assistance: Independent  Active : Yes    Objective  Observation/Palpation  Posture: Good    RLE General AROM: WFL knee & ankle, decreased ~25-50% in hip following recent surgery  LLE AROM : WFL  Strength RLE: Not formally strength tested; appears at least 3/5 in knee & ankle, 3-/5 in hip  Strength LLE: WFL    Bed mobility  Supine to Sit: Unable to assess(pt OOB and in chair before & after therapy)  Scooting: Independent(to scoot forward<>backward in chair)     Transfers  Sit to Stand: Stand by assistance(from chair to std. walker)  Stand to sit: Supervision(from std. walker to chair)     Ambulation  WB Status: WBAT RLE  Surface: level tile  Device: Standard Walker  Assistance: Contact guard assistance;Stand by assistance(CGA x 1 initially, quickly decreasing to SBA with repetition)  Quality of Gait: Pt amb with slow amparo using step-to gait pattern. Min cues needed for proper gait sequencing with walker. Safe and steady without LOB. Distance: x 75 feet  Comments: Amb distance limited c/o R hip soreness.     Balance  Posture: Good  Sitting - Static: Good  Sitting - Dynamic: Good;-  Standing - Static: Good  Standing - Dynamic: Good;-(when using walker)     Exercises  Gluteal Sets: x 10 bilat  Knee Long Arc Quad: x 10 RLE  Ankle Pumps: x 10 BLE  Comments: Exercises performed while seated in chair. Plan   Times per week: BID 7x/week  Times per day: Twice a day  Specific instructions for Next Treatment: Progress ther ex and mobility as tolerated, assess stair amb prior to D/C home  Current Treatment Recommendations: Strengthening, Gait Training, Functional Mobility Training, Transfer Training, Safety Education & Training, Patient/Caregiver Education & Training, Equipment Evaluation, Education, & procurement, Positioning, Home Exercise Program, ROM, Stair training  Safety Devices: All fall risk precautions in place, Left in chair, Call light within reach, Nurse notified, Gait belt    AM-PAC Score  AM-PAC Inpatient Mobility Raw Score : 21  AM-PAC Inpatient T-Scale Score : 50.25  Mobility Inpatient CMS 0-100% Score: 28.97  Mobility Inpatient CMS G-Code Modifier : CJ    Goals  Short term goals  Time Frame for Short term goals: 1-2 days (unless otherwise specified)  Short term goal 1: Pt will transfer supine <-> sit with modified(I)  Short term goal 2: Pt will transfer sit <-> stand and bed>chair using std. walker with modified(I)  Short term goal 3: Pt will ambulate x 150 feet using std. walker with supervision  Short term goal 4: By 3/28/19: Pt will tolerate 12-15 reps BLE exercise for ROM/strengthening  Short term goal 5: Pt will ambulate up/down 2 steps using 1 handrail (using additional AD as needed) with CGA/SBA x 1  Patient Goals   Patient goals :  \"To go home\"       Therapy Time   Individual Concurrent Group Co-treatment   Time In 1100         Time Out 1140         Minutes 40         Timed Code Treatment Minutes: [address] [facility], [address] [facility], DPT #[id number]

## 2019-03-26 NOTE — PROGRESS NOTES
Department of Orthopedic Surgery  Physician Assistant   Progress Note    Subjective:     Post-Operative Day: 1 Status Post right  Hip Avel Arthroplasty  Systemic or Specific Complaints:No Complaints today. Just took one percocet this am in anticipation of therapy. Objective:     Patient Vitals for the past 24 hrs:   BP Temp Temp src Pulse Resp SpO2 Height Weight   03/26/19 0825 139/75 98.5 °F (36.9 °C) Oral 107 16 -- -- --   03/26/19 0358 120/79 98.3 °F (36.8 °C) Oral 89 16 -- -- --   03/25/19 2205 (!) 145/87 97.8 °F (36.6 °C) Oral 104 16 94 % -- --   03/25/19 2030 135/88 98.1 °F (36.7 °C) Oral 112 16 -- -- --   03/25/19 1930 133/82 97.9 °F (36.6 °C) Oral 109 16 -- -- --   03/25/19 1855 134/85 98 °F (36.7 °C) -- 100 16 95 % -- --   03/25/19 1834 -- -- -- -- -- -- 5' 5\" (1.651 m) 185 lb (83.9 kg)   03/25/19 1816 (!) 151/71 97.9 °F (36.6 °C) Oral 95 18 92 % -- --   03/25/19 1640 (!) 146/78 -- -- 108 18 (!) 82 % -- --   03/25/19 1625 (!) 130/93 -- -- 96 17 98 % -- --   03/25/19 1610 (!) 140/89 -- -- 102 19 97 % -- --   03/25/19 1555 (!) 146/80 -- -- 95 14 94 % -- --   03/25/19 1502 -- 97 °F (36.1 °C) Temporal -- -- -- -- --   03/25/19 1440 (!) 152/92 -- -- 110 13 90 % -- --   03/25/19 1435 (!) 164/88 -- -- 106 16 94 % -- --   03/25/19 1425 (!) 149/82 -- -- 114 16 90 % -- --   03/25/19 1420 (!) 162/80 -- -- 114 18 94 % -- --   03/25/19 1415 (!) 161/100 -- -- 120 23 97 % -- --   03/25/19 1414 -- 97 °F (36.1 °C) Temporal -- -- -- -- --       General: alert, appears stated age, cooperative and no distress   Wound: Wound clean and dry no evidence of infection. Motion: Painful range of Motion   DVT Exam: No evidence of DVT seen on physical exam.       NVI in lower extremity. Thigh swollen but soft. Moving foot and ankle.       Data Review  CBC:   Lab Results   Component Value Date    WBC 8.5 03/26/2019    RBC 3.16 03/26/2019    HGB 10.0 03/26/2019    HCT 30.1 03/26/2019     03/26/2019       Assessment:

## 2019-03-26 NOTE — PLAN OF CARE
Problem: Musculor/Skeletal Functional Status  Intervention: OT Evaluation/treatment  Note:   Increase patients ADLs/functional status to baseline.

## 2019-03-27 VITALS
DIASTOLIC BLOOD PRESSURE: 66 MMHG | RESPIRATION RATE: 16 BRPM | WEIGHT: 185 LBS | HEART RATE: 90 BPM | TEMPERATURE: 98.8 F | SYSTOLIC BLOOD PRESSURE: 109 MMHG | BODY MASS INDEX: 30.82 KG/M2 | HEIGHT: 65 IN | OXYGEN SATURATION: 97 %

## 2019-03-27 LAB
BASOPHILS ABSOLUTE: 0.1 K/UL (ref 0–0.2)
BASOPHILS RELATIVE PERCENT: 0.9 %
EOSINOPHILS ABSOLUTE: 0.1 K/UL (ref 0–0.6)
EOSINOPHILS RELATIVE PERCENT: 1.4 %
HCT VFR BLD CALC: 25.1 % (ref 36–48)
HEMOGLOBIN: 8.5 G/DL (ref 12–16)
LYMPHOCYTES ABSOLUTE: 1.2 K/UL (ref 1–5.1)
LYMPHOCYTES RELATIVE PERCENT: 19.2 %
MCH RBC QN AUTO: 32.1 PG (ref 26–34)
MCHC RBC AUTO-ENTMCNC: 33.8 G/DL (ref 31–36)
MCV RBC AUTO: 95 FL (ref 80–100)
MONOCYTES ABSOLUTE: 0.6 K/UL (ref 0–1.3)
MONOCYTES RELATIVE PERCENT: 9 %
NEUTROPHILS ABSOLUTE: 4.3 K/UL (ref 1.7–7.7)
NEUTROPHILS RELATIVE PERCENT: 69.5 %
PDW BLD-RTO: 16.6 % (ref 12.4–15.4)
PLATELET # BLD: 203 K/UL (ref 135–450)
PMV BLD AUTO: 7.7 FL (ref 5–10.5)
RBC # BLD: 2.65 M/UL (ref 4–5.2)
WBC # BLD: 6.2 K/UL (ref 4–11)

## 2019-03-27 PROCEDURE — 6370000000 HC RX 637 (ALT 250 FOR IP): Performed by: ANESTHESIOLOGY

## 2019-03-27 PROCEDURE — 97530 THERAPEUTIC ACTIVITIES: CPT

## 2019-03-27 PROCEDURE — 6360000002 HC RX W HCPCS: Performed by: ORTHOPAEDIC SURGERY

## 2019-03-27 PROCEDURE — 85025 COMPLETE CBC W/AUTO DIFF WBC: CPT

## 2019-03-27 PROCEDURE — 2580000003 HC RX 258: Performed by: ORTHOPAEDIC SURGERY

## 2019-03-27 PROCEDURE — 97110 THERAPEUTIC EXERCISES: CPT

## 2019-03-27 PROCEDURE — 36415 COLL VENOUS BLD VENIPUNCTURE: CPT

## 2019-03-27 PROCEDURE — 97116 GAIT TRAINING THERAPY: CPT

## 2019-03-27 PROCEDURE — APPSS45 APP SPLIT SHARED TIME 31-45 MINUTES: Performed by: PHYSICIAN ASSISTANT

## 2019-03-27 PROCEDURE — 6370000000 HC RX 637 (ALT 250 FOR IP): Performed by: ORTHOPAEDIC SURGERY

## 2019-03-27 RX ORDER — ONDANSETRON 4 MG/1
4 TABLET, FILM COATED ORAL DAILY PRN
Qty: 30 TABLET | Refills: 0 | Status: SHIPPED | OUTPATIENT
Start: 2019-03-27

## 2019-03-27 RX ADMIN — CELECOXIB 100 MG: 100 CAPSULE ORAL at 09:13

## 2019-03-27 RX ADMIN — ONDANSETRON 4 MG: 2 INJECTION INTRAMUSCULAR; INTRAVENOUS at 15:36

## 2019-03-27 RX ADMIN — OXYCODONE HYDROCHLORIDE AND ACETAMINOPHEN 1 TABLET: 5; 325 TABLET ORAL at 09:05

## 2019-03-27 RX ADMIN — Medication 10 ML: at 09:09

## 2019-03-27 RX ADMIN — ENOXAPARIN SODIUM 40 MG: 40 INJECTION SUBCUTANEOUS at 09:13

## 2019-03-27 RX ADMIN — OXYCODONE HYDROCHLORIDE AND ACETAMINOPHEN 1 TABLET: 5; 325 TABLET ORAL at 00:21

## 2019-03-27 RX ADMIN — OXYCODONE HYDROCHLORIDE AND ACETAMINOPHEN 0.5 TABLET: 5; 325 TABLET ORAL at 09:52

## 2019-03-27 RX ADMIN — DOCUSATE SODIUM 100 MG: 100 CAPSULE, LIQUID FILLED ORAL at 09:10

## 2019-03-27 RX ADMIN — OXYCODONE HYDROCHLORIDE AND ACETAMINOPHEN 1.5 TABLET: 5; 325 TABLET ORAL at 15:00

## 2019-03-27 RX ADMIN — ONDANSETRON 4 MG: 2 INJECTION INTRAMUSCULAR; INTRAVENOUS at 09:05

## 2019-03-27 ASSESSMENT — PAIN SCALES - GENERAL
PAINLEVEL_OUTOF10: 7
PAINLEVEL_OUTOF10: 4
PAINLEVEL_OUTOF10: 4
PAINLEVEL_OUTOF10: 6
PAINLEVEL_OUTOF10: 6
PAINLEVEL_OUTOF10: 4
PAINLEVEL_OUTOF10: 6
PAINLEVEL_OUTOF10: 7

## 2019-03-27 ASSESSMENT — PAIN DESCRIPTION - ORIENTATION: ORIENTATION: RIGHT

## 2019-03-27 ASSESSMENT — PAIN DESCRIPTION - DESCRIPTORS: DESCRIPTORS: ACHING

## 2019-03-27 ASSESSMENT — PAIN DESCRIPTION - PAIN TYPE: TYPE: SURGICAL PAIN

## 2019-03-27 ASSESSMENT — PAIN DESCRIPTION - LOCATION: LOCATION: HIP

## 2019-03-27 NOTE — PROGRESS NOTES
Occupational Therapy  Facility/Department: [facility] C5 - MED SURG/ORTHO  Daily Treatment Note  NAME: Estrellita Salazar  : 1955  MRN: [id number]    Date of Service: 3/27/2019    Discharge Recommendations:  Home with assist PRN  OT Equipment Recommendations  Equipment Needed: Yes  Mobility Devices: ADL Assistive Devices  ADL Assistive Devices: Toileting - Raised Toilet Seat with arms  Other: Patient lives in 2 story home, unable to complete full flight of stairs to 2nd floor bathroom where comfort height toilet is located. 1/2 bath on main level is what patient will be using and at this time patient would benefit from raised toilet seat with arms as patient unable to safely sit or stand from her current standard toilet height. With a raised toilet seat w/ grab bars this will allow patient to increase her level of independence with toilet transfers     Assessment   Performance deficits / Impairments: Decreased functional mobility ; Decreased endurance;Decreased ADL status; Decreased balance  Assessment: Patient verbalized understanding for safe car transfer this date. Also, discussed benefits of toilet raiser for main level bathroom, as patient unable to complete full flight of stairs where comfort height toilet is located. Prognosis: Good  Safety Devices  Safety Devices in place: Yes  Type of devices: Left in chair;Call light within reach;Gait belt;Nurse notified; Chair alarm in place         Patient Diagnosis(es): The primary encounter diagnosis was Status post hip surgery. A diagnosis of Pathologic fracture of femoral neck, right, initial encounter [facility]) was also pertinent to this visit. has a past medical history of Cancer ([address] and Hypertension. has a past surgical history that includes Hysterectomy; Kidney surgery (Right); other surgical history (Right); and HEMIARTHROPLASTY HIP (Right, 3/25/2019).     Restrictions  Restrictions/Precautions  Restrictions/Precautions: Weight Bearing, Fall Risk, ROM Restrictions  Lower Extremity Weight Bearing Restrictions  Right Lower Extremity Weight Bearing: Weight Bearing As Tolerated  Position Activity Restriction  Hip Precautions: No ADduction, No hip extension, No hip external rotation  Subjective   General  Chart Reviewed: Yes  Patient assessed for rehabilitation services?: Yes  Family / Caregiver Present: Yes(family)  Referring Practitioner: Bandar Lozano MD 3/25/19  Diagnosis: R hip pathological fx, s/p R anterior THR 3/25/19  Subjective  Subjective: Pt. in chair upon entry, pleasant and agreeable to OT session. Pain Assessment  Pain Assessment: 0-10  Pain Level: 6  Pain Type: Surgical pain  Pain Location: Hip  Pain Orientation: Right  Pain Descriptors: Aching  Non-Pharmaceutical Pain Intervention(s): Emotional support  Vital Signs  Patient Currently in Pain: Yes   Orientation  Orientation  Overall Orientation Status: Within Functional Limits  Objective        Standing Balance  Sit to stand: Unable to assess     Transfers  Sit to stand: Unable to assess      Car Transfers  Car Transfers: Educated/demonstrated safe car transfer with patient, pt verbalized technique with great understanding.  Pt. plans to d/c home in a [employer] car           Plan   Plan  Times per week: 4-6x's a week while in acute care    AM-PAC Score        AM-PAC Inpatient Daily Activity Raw Score: 20  AM-PAC Inpatient ADL T-Scale Score : 42.03  ADL Inpatient CMS 0-100% Score: 38.32  ADL Inpatient CMS G-Code Modifier : CJ    Goals  Short term goals  Time Frame for Short term goals: 1 week unless otherwise specified  Short term goal 1: Pt. will verbalize understanding of safe car transfer after education by 3/27-STG met 3/27/19  Short term goal 2: Pt. will complete LE dressing with SBA  Patient Goals   Patient goals : \"to get back to my normal routine\"       Therapy Time   Individual Concurrent Group Co-treatment   Time In 1129         Time Out 1139         Minutes 10         Timed Code Treatment Minutes: Tyler OTR/L

## 2019-03-27 NOTE — PROGRESS NOTES
Physical Therapy  Facility/Department: [facility] C5 - MED SURG/ORTHO  Daily Treatment Note  NAME: Radha Stone  : 1955  MRN: [id number]    Date of Service: 3/27/2019    Discharge Recommendations:  Home with assist PRN, Outpatient PT(v. HHPT)   PT Equipment Recommendations  Equipment Needed: Yes  Mobility Devices: Pinkey Lapine: Standard    Patient Diagnosis(es): The primary encounter diagnosis was Status post hip surgery. A diagnosis of Pathologic fracture of femoral neck, right, initial encounter [facility]) was also pertinent to this visit. has a past medical history of Cancer ([facility] [address] and Hypertension. has a past surgical history that includes Hysterectomy; Kidney surgery (Right); other surgical history (Right); and HEMIARTHROPLASTY HIP (Right, 3/25/2019). Restrictions  Restrictions/Precautions  Restrictions/Precautions: Weight Bearing, Fall Risk, ROM Restrictions  Lower Extremity Weight Bearing Restrictions  Right Lower Extremity Weight Bearing: Weight Bearing As Tolerated  Position Activity Restriction  Hip Precautions: No ADduction, No hip extension, No hip external rotation     Subjective   General  Chart Reviewed: Yes  Additional Pertinent Hx: Endometrial CA (currently on a break from chemo)  Family / Caregiver Present: Yes(rijulius)  Referring Practitioner: Dr. Sage Sr: Pt agreeable to work with PT on third attempt  Pain Screening  Patient Currently in Pain: Yes  Pain Assessment  Pain Assessment: 0-10  Pain Level:  6(with ambulation)  Vital Signs  Patient Currently in Pain: Yes       Orientation  Orientation  Overall Orientation Status: Within Normal Limits    Objective   Bed mobility  Supine to Sit: Unable to assess  Sit to Supine: Unable to assess  Transfers  Sit to Stand: Stand by assistance;Supervision  Stand to sit: Stand by assistance;Supervision  Ambulation  Ambulation?: Yes  WB Status: WBAT RLE  Ambulation 1  Surface: level tile  Device: Standard Walker  Assistance: Stand by assistance  Quality of Gait: slow amparo using step-to gait pattern. Safe and steady without LOB. Distance: 120 feet          Exercises:  Quad Sets: 15 B  Gluteal Sets: 15 B  Heelslides: 12 R with self assist  Knee Long Arc Quad: 12 R  Ankle Pumps: x 20 BLE  Comments: Exercises performed while seated in chair. Hip precautions reviewed. Assessment   Body structures, Functions, Activity limitations: Decreased functional mobility ; Decreased ROM; Decreased strength;Decreased endurance  Assessment: Pt ambulating 120 feet in hallway with std. walker and SBA x 1. Safe and steady with good standing balance during mobility. Anticipate no barriers to pt discharging directly home from hospital  Recommend std. walker for home use. Treatment Diagnosis: Decreased ROM/strength RLE and (I) with mobility s/p R hip bipolar hemiarthroplasty  Specific instructions for Next Treatment: Progress ther ex and mobility as tolerated, assess stair amb prior to D/C home  Prognosis: Good  Patient Education: Role of PT, benefits of mobility, safety in transfers/amb with std. walker, WBAT, RLE ther ex, rehab progression while in hospital - pt verbalizes understanding  REQUIRES PT FOLLOW UP: Yes  Activity Tolerance  Activity Tolerance: Patient Tolerated treatment well       AM-PAC Score  AM-PAC Inpatient Mobility Raw Score : 21  AM-PAC Inpatient T-Scale Score : 50.25  Mobility Inpatient CMS 0-100% Score: 28.97  Mobility Inpatient CMS G-Code Modifier : CJ          Goals  Short term goals  Time Frame for Short term goals: 1-2 days (unless otherwise specified)  Short term goal 1: Pt will transfer supine <-> sit with modified(I)  Short term goal 2: Pt will transfer sit <-> stand and bed>chair using std. walker with modified(I)  Short term goal 3: Pt will ambulate x 150 feet using std.  walker with supervision  Short term goal 4: By 3/28/19: Pt will tolerate 12-15 reps BLE exercise for ROM/strengthening  Short term goal 5: Pt

## 2019-03-27 NOTE — DISCHARGE SUMMARY
Department of Orthopedic Surgery  Physician Assistant   Discharge Summary    The Radha tSone is a 61 y.o. female admitted for right hip hemiarthroplasty, pending pathological femur fracture. Radha Stone was admitted to the floor following Her recovery in the PACU. Discharge Diagnosis  right hip hemiarthroplasty, pending pathological femur fracture    Current Inpatient Medications    Current Facility-Administered Medications: acetaminophen (TYLENOL) tablet 650 mg, 650 mg, Oral, Q4H PRN  celecoxib (CELEBREX) capsule 100 mg, 100 mg, Oral, BID  cyclobenzaprine (FLEXERIL) tablet 10 mg, 10 mg, Oral, TID PRN  dextrose 5 % and 0.45 % NaCl with KCl 20 mEq infusion, 1,000 mL, Intravenous, Continuous  sodium chloride flush 0.9 % injection 10 mL, 10 mL, Intravenous, 2 times per day  sodium chloride flush 0.9 % injection 10 mL, 10 mL, Intravenous, PRN  oxyCODONE-acetaminophen (PERCOCET) 5-325 MG per tablet 1 tablet, 1 tablet, Oral, Q4H PRN **OR** oxyCODONE-acetaminophen (PERCOCET) 5-325 MG per tablet 2 tablet, 2 tablet, Oral, Q4H PRN  HYDROmorphone (DILAUDID) injection 0.25 mg, 0.25 mg, Intravenous, Q3H PRN **OR** HYDROmorphone (DILAUDID) injection 0.5 mg, 0.5 mg, Intravenous, Q3H PRN  docusate sodium (COLACE) capsule 100 mg, 100 mg, Oral, BID  ondansetron (ZOFRAN) injection 4 mg, 4 mg, Intravenous, Q6H PRN  enoxaparin (LOVENOX) injection 40 mg, 40 mg, Subcutaneous, Daily  diphenhydrAMINE (BENADRYL) tablet 25 mg, 25 mg, Oral, Q6H PRN    Post-operatively the patients diet was advanced as tolerated and their dressing was changed on POD #1. The incision is dressing in place, clean, dry and intact with no signs of infection. The patient remained neurovascularly intact in the right lower and had intact pulses distally. Patients calf remained soft and showed no evidence of DVT. The patient was able to move their right lower extremity without any problems post-operatively.   Physical therapy and occupational therapy were consulted and began working with the patient post-operatively. The patient progressed with PT/OT as would be expected and continued to improve through their stay. The patients pain was initially controlled with IV medications but we were able to transition to oral pain medications soon after arrival to the floor and their pain remained under good control through their hospital stay. From a medical standpoint the patient remained stable and continued to have the medicine team follow throughout their stay. The patient will be discharged at this time to Home  with their current diet restrictions and will continue to follow the precautions outlined to them by us and PT/OT. Condition on Discharge: Stable    Plan  Return visit in 2 weeks. .  Patient was instructed on the use of pain medications, the signs and symptoms of infection, and was given our number to call should they have any questions or concerns following discharge. For opioid prescriptions given at discharge the following statement is provided for compliance with [employer]SMB rules. Patient being given increased dosage/quantity of opoid pain medication in excess of OSMB guidelines which noted a 30 MED daily of opioids due to the fact that he/she has undergone major orthopaedic surgery as outlined in rule 4731-11-13. Dosages and further duration of the pain medication will be re-evaluated at her post op visit in 2 weeks. Patient was educated on dosing expectations and limits of prescribing as a result of the new [facility] Board rules enacted August 31, 2017. Please also note that this is not the initial opoid prescription issued to this patient but a continuation of medication utilized during the hospital admission as noted in the medical record. [place]ARRS report has also been utilized to screen for any abuse history or suspicious activity as outlined in [place].   All efforts have been taken to prevent abuse potential and misuse of opioid medications including education, screening, and close clinical follow up.

## 2019-03-27 NOTE — PROGRESS NOTES
Delivered walker to [facility] room.   Thanks for the referral.  Vikram Gamboa  3/27/2019

## 2019-03-27 NOTE — CARE COORDINATION
Received call from Gil Castro with [facility], re: calling to verify pt's information so that she can obtain all needed info from Epic. Gil Castro states she will call CM if she needs additional information but she can obtain HHC order and AVS, etc, thru Epic access.
Referral placed to [facility] for HHC. They are able to accept pt at DC.      Kalyn Tomlin RN
7

## 2019-03-27 NOTE — PROGRESS NOTES
Patient educated on discharge. Patient verbalized understanding. IV removed. Patient waiting for  to pick her up. Belongings all collected and packed by daughter.

## 2019-03-27 NOTE — PROGRESS NOTES
Physical Therapy  Facility/Department: [facility] C5 - MED SURG/ORTHO  Daily Treatment Note  NAME: Estrellita Salazar  : 1955  MRN: [id number]    Date of Service: 3/27/2019    Discharge Recommendations:  Home with assist PRN, Outpatient PT(v. HHPT)   PT Equipment Recommendations  Equipment Needed: Yes  Mobility Devices: Delora Ports: Standard    Patient Diagnosis(es): The primary encounter diagnosis was Status post hip surgery. A diagnosis of Pathologic fracture of femoral neck, right, initial encounter [facility]) was also pertinent to this visit. has a past medical history of Cancer ([facility] [address] and Hypertension. has a past surgical history that includes Hysterectomy; Kidney surgery (Right); other surgical history (Right); and HEMIARTHROPLASTY HIP (Right, 3/25/2019). Restrictions  Restrictions/Precautions  Restrictions/Precautions: Weight Bearing, Fall Risk, ROM Restrictions  Lower Extremity Weight Bearing Restrictions  Right Lower Extremity Weight Bearing: Weight Bearing As Tolerated  Position Activity Restriction  Hip Precautions: No ADduction, No hip extension, No hip external rotation     Subjective   General  Chart Reviewed: Yes  Additional Pertinent Hx: Endometrial CA (currently on a break from chemo)  Family / Caregiver Present: Yes(rijulius)  Referring Practitioner: Dr. Shoaib Mills: Pt agreeable to work with PT on third attempt  Pain Screening  Patient Currently in Pain: Yes  Pain Assessment  Pain Assessment: 0-10  Pain Level:  6(with ambulation)  Vital Signs  Patient Currently in Pain: Yes       Orientation  Orientation  Overall Orientation Status: Within Normal Limits       Objective   Bed mobility  Supine to Sit: Unable to assess  Sit to Supine: Unable to assess  Transfers  Sit to Stand: Stand by assistance;Supervision  Stand to sit: Stand by assistance;Supervision  Ambulation  Ambulation?: Yes  WB Status: WBAT RLE  Ambulation 1  Surface: level tile  Device: Standard Walker  Assistance: Stand by

## 2019-03-29 NOTE — OP NOTE
operating  room to the Recovery, where she arrived in a stable condition.         Eva Mayorga MD    D: 03/28/2019 21:03:55       T: 03/29/2019 1:06:10     [email or web address]  Job#: [id number]     Doc#: [id number]    CC:

## 2019-04-01 ENCOUNTER — HOSPITAL ENCOUNTER (INPATIENT)
Age: 64
LOS: 2 days | Discharge: HOME | DRG: 392 | End: 2019-04-03
Attending: EMERGENCY MEDICINE | Admitting: INTERNAL MEDICINE
Payer: COMMERCIAL

## 2019-04-01 DIAGNOSIS — R00.0 TACHYCARDIA: Primary | ICD-10-CM

## 2019-04-01 DIAGNOSIS — R11.2 NON-INTRACTABLE VOMITING WITH NAUSEA, UNSPECIFIED VOMITING TYPE: ICD-10-CM

## 2019-04-01 DIAGNOSIS — E86.0 DEHYDRATION: ICD-10-CM

## 2019-04-01 DIAGNOSIS — Z96.641 S/P HIP REPLACEMENT, RIGHT: ICD-10-CM

## 2019-04-01 LAB
A/G RATIO: 1 (ref 1.1–2.2)
ALBUMIN SERPL-MCNC: 4.3 G/DL (ref 3.4–5)
ALP BLD-CCNC: 164 U/L (ref 40–129)
ALT SERPL-CCNC: 17 U/L (ref 10–40)
ANION GAP SERPL CALCULATED.3IONS-SCNC: 18 MMOL/L (ref 3–16)
AST SERPL-CCNC: 25 U/L (ref 15–37)
BACTERIA: ABNORMAL /HPF
BASOPHILS ABSOLUTE: 0.1 K/UL (ref 0–0.2)
BASOPHILS RELATIVE PERCENT: 0.9 %
BILIRUB SERPL-MCNC: 0.8 MG/DL (ref 0–1)
BILIRUBIN URINE: ABNORMAL
BLOOD, URINE: ABNORMAL
BUN BLDV-MCNC: 13 MG/DL (ref 7–20)
CALCIUM SERPL-MCNC: 10.1 MG/DL (ref 8.3–10.6)
CHLORIDE BLD-SCNC: 98 MMOL/L (ref 99–110)
CLARITY: CLEAR
CO2: 24 MMOL/L (ref 21–32)
COLOR: YELLOW
CREAT SERPL-MCNC: 0.8 MG/DL (ref 0.6–1.2)
EOSINOPHILS ABSOLUTE: 0 K/UL (ref 0–0.6)
EOSINOPHILS RELATIVE PERCENT: 0.3 %
EPITHELIAL CELLS, UA: ABNORMAL /HPF
GFR AFRICAN AMERICAN: >60
GFR NON-AFRICAN AMERICAN: >60
GLOBULIN: 4.3 G/DL
GLUCOSE BLD-MCNC: 111 MG/DL (ref 70–99)
GLUCOSE URINE: NEGATIVE MG/DL
HCT VFR BLD CALC: 34.2 % (ref 36–48)
HEMOGLOBIN: 11.2 G/DL (ref 12–16)
KETONES, URINE: >=80 MG/DL
LEUKOCYTE ESTERASE, URINE: NEGATIVE
LYMPHOCYTES ABSOLUTE: 0.9 K/UL (ref 1–5.1)
LYMPHOCYTES RELATIVE PERCENT: 13.4 %
MCH RBC QN AUTO: 30.8 PG (ref 26–34)
MCHC RBC AUTO-ENTMCNC: 32.9 G/DL (ref 31–36)
MCV RBC AUTO: 93.7 FL (ref 80–100)
MICROSCOPIC EXAMINATION: YES
MONOCYTES ABSOLUTE: 0.4 K/UL (ref 0–1.3)
MONOCYTES RELATIVE PERCENT: 6 %
MUCUS: ABNORMAL /LPF
NEUTROPHILS ABSOLUTE: 5.5 K/UL (ref 1.7–7.7)
NEUTROPHILS RELATIVE PERCENT: 79.4 %
NITRITE, URINE: NEGATIVE
PDW BLD-RTO: 15.9 % (ref 12.4–15.4)
PH UA: 5.5 (ref 5–8)
PLATELET # BLD: 398 K/UL (ref 135–450)
PMV BLD AUTO: 7.9 FL (ref 5–10.5)
POTASSIUM SERPL-SCNC: 4 MMOL/L (ref 3.5–5.1)
PROTEIN UA: 100 MG/DL
RBC # BLD: 3.65 M/UL (ref 4–5.2)
RBC UA: ABNORMAL /HPF (ref 0–2)
SODIUM BLD-SCNC: 140 MMOL/L (ref 136–145)
SPECIFIC GRAVITY UA: >=1.03 (ref 1–1.03)
TOTAL PROTEIN: 8.6 G/DL (ref 6.4–8.2)
URINE REFLEX TO CULTURE: YES
URINE TYPE: ABNORMAL
UROBILINOGEN, URINE: 0.2 E.U./DL
WBC # BLD: 6.9 K/UL (ref 4–11)
WBC UA: ABNORMAL /HPF (ref 0–5)

## 2019-04-01 PROCEDURE — 1200000000 HC SEMI PRIVATE

## 2019-04-01 PROCEDURE — 2580000003 HC RX 258: Performed by: INTERNAL MEDICINE

## 2019-04-01 PROCEDURE — 85025 COMPLETE CBC W/AUTO DIFF WBC: CPT

## 2019-04-01 PROCEDURE — 99285 EMERGENCY DEPT VISIT HI MDM: CPT

## 2019-04-01 PROCEDURE — 96361 HYDRATE IV INFUSION ADD-ON: CPT

## 2019-04-01 PROCEDURE — 2500000003 HC RX 250 WO HCPCS: Performed by: PHYSICIAN ASSISTANT

## 2019-04-01 PROCEDURE — 6360000002 HC RX W HCPCS: Performed by: INTERNAL MEDICINE

## 2019-04-01 PROCEDURE — 2580000003 HC RX 258: Performed by: PHYSICIAN ASSISTANT

## 2019-04-01 PROCEDURE — 81001 URINALYSIS AUTO W/SCOPE: CPT

## 2019-04-01 PROCEDURE — 96375 TX/PRO/DX INJ NEW DRUG ADDON: CPT

## 2019-04-01 PROCEDURE — 6370000000 HC RX 637 (ALT 250 FOR IP): Performed by: PHYSICIAN ASSISTANT

## 2019-04-01 PROCEDURE — 80053 COMPREHEN METABOLIC PANEL: CPT

## 2019-04-01 PROCEDURE — 6370000000 HC RX 637 (ALT 250 FOR IP): Performed by: INTERNAL MEDICINE

## 2019-04-01 PROCEDURE — 6360000002 HC RX W HCPCS: Performed by: PHYSICIAN ASSISTANT

## 2019-04-01 PROCEDURE — 96374 THER/PROPH/DIAG INJ IV PUSH: CPT

## 2019-04-01 PROCEDURE — 87086 URINE CULTURE/COLONY COUNT: CPT

## 2019-04-01 RX ORDER — ACETAMINOPHEN 650 MG/1
650 SUPPOSITORY RECTAL ONCE
Status: COMPLETED | OUTPATIENT
Start: 2019-04-01 | End: 2019-04-01

## 2019-04-01 RX ORDER — SODIUM CHLORIDE 0.9 % (FLUSH) 0.9 %
10 SYRINGE (ML) INJECTION EVERY 12 HOURS SCHEDULED
Status: DISCONTINUED | OUTPATIENT
Start: 2019-04-01 | End: 2019-04-03 | Stop reason: HOSPADM

## 2019-04-01 RX ORDER — 0.9 % SODIUM CHLORIDE 0.9 %
1000 INTRAVENOUS SOLUTION INTRAVENOUS ONCE
Status: COMPLETED | OUTPATIENT
Start: 2019-04-01 | End: 2019-04-01

## 2019-04-01 RX ORDER — ONDANSETRON 2 MG/ML
4 INJECTION INTRAMUSCULAR; INTRAVENOUS EVERY 6 HOURS PRN
Status: DISCONTINUED | OUTPATIENT
Start: 2019-04-01 | End: 2019-04-03 | Stop reason: HOSPADM

## 2019-04-01 RX ORDER — ACETAMINOPHEN 325 MG/1
650 TABLET ORAL EVERY 4 HOURS PRN
Status: DISCONTINUED | OUTPATIENT
Start: 2019-04-01 | End: 2019-04-03 | Stop reason: HOSPADM

## 2019-04-01 RX ORDER — 0.9 % SODIUM CHLORIDE 0.9 %
2000 INTRAVENOUS SOLUTION INTRAVENOUS ONCE
Status: COMPLETED | OUTPATIENT
Start: 2019-04-01 | End: 2019-04-01

## 2019-04-01 RX ORDER — SODIUM CHLORIDE 0.9 % (FLUSH) 0.9 %
10 SYRINGE (ML) INJECTION PRN
Status: DISCONTINUED | OUTPATIENT
Start: 2019-04-01 | End: 2019-04-03 | Stop reason: HOSPADM

## 2019-04-01 RX ORDER — LORAZEPAM 0.5 MG/1
0.5 TABLET ORAL EVERY 6 HOURS PRN
Status: DISCONTINUED | OUTPATIENT
Start: 2019-04-01 | End: 2019-04-03 | Stop reason: HOSPADM

## 2019-04-01 RX ORDER — MORPHINE SULFATE 4 MG/ML
4 INJECTION, SOLUTION INTRAMUSCULAR; INTRAVENOUS ONCE
Status: COMPLETED | OUTPATIENT
Start: 2019-04-01 | End: 2019-04-01

## 2019-04-01 RX ORDER — ONDANSETRON 2 MG/ML
4 INJECTION INTRAMUSCULAR; INTRAVENOUS ONCE
Status: COMPLETED | OUTPATIENT
Start: 2019-04-01 | End: 2019-04-01

## 2019-04-01 RX ORDER — TRAMADOL HYDROCHLORIDE 50 MG/1
50 TABLET ORAL ONCE
Status: COMPLETED | OUTPATIENT
Start: 2019-04-01 | End: 2019-04-01

## 2019-04-01 RX ADMIN — TRAMADOL HYDROCHLORIDE 50 MG: 50 TABLET, FILM COATED ORAL at 19:00

## 2019-04-01 RX ADMIN — MORPHINE SULFATE 4 MG: 4 INJECTION INTRAVENOUS at 17:22

## 2019-04-01 RX ADMIN — ACETAMINOPHEN 650 MG: 650 SUPPOSITORY RECTAL at 20:07

## 2019-04-01 RX ADMIN — ONDANSETRON 4 MG: 2 INJECTION INTRAMUSCULAR; INTRAVENOUS at 23:32

## 2019-04-01 RX ADMIN — SODIUM CHLORIDE 2000 ML: 9 INJECTION, SOLUTION INTRAVENOUS at 17:22

## 2019-04-01 RX ADMIN — SODIUM CHLORIDE 1000 ML: 9 INJECTION, SOLUTION INTRAVENOUS at 19:50

## 2019-04-01 RX ADMIN — ONDANSETRON 4 MG: 2 INJECTION INTRAMUSCULAR; INTRAVENOUS at 17:22

## 2019-04-01 RX ADMIN — SODIUM CHLORIDE, PRESERVATIVE FREE 10 ML: 5 INJECTION INTRAVENOUS at 23:32

## 2019-04-01 RX ADMIN — LORAZEPAM 0.5 MG: 0.5 TABLET ORAL at 23:32

## 2019-04-01 RX ADMIN — FAMOTIDINE 20 MG: 10 INJECTION, SOLUTION INTRAVENOUS at 19:00

## 2019-04-01 ASSESSMENT — ENCOUNTER SYMPTOMS
NAUSEA: 1
SHORTNESS OF BREATH: 0
EYES NEGATIVE: 1
COLOR CHANGE: 0
ABDOMINAL PAIN: 0
VOMITING: 1

## 2019-04-01 ASSESSMENT — PAIN SCALES - GENERAL
PAINLEVEL_OUTOF10: 4
PAINLEVEL_OUTOF10: 0
PAINLEVEL_OUTOF10: 8
PAINLEVEL_OUTOF10: 7
PAINLEVEL_OUTOF10: 6
PAINLEVEL_OUTOF10: 3
PAINLEVEL_OUTOF10: 2

## 2019-04-01 ASSESSMENT — PAIN DESCRIPTION - DESCRIPTORS: DESCRIPTORS: ACHING

## 2019-04-01 ASSESSMENT — PAIN DESCRIPTION - FREQUENCY: FREQUENCY: CONTINUOUS

## 2019-04-01 ASSESSMENT — PAIN DESCRIPTION - PAIN TYPE: TYPE: ACUTE PAIN

## 2019-04-01 ASSESSMENT — PAIN DESCRIPTION - ORIENTATION: ORIENTATION: RIGHT

## 2019-04-01 ASSESSMENT — PAIN DESCRIPTION - LOCATION: LOCATION: HIP

## 2019-04-01 ASSESSMENT — PAIN DESCRIPTION - ONSET: ONSET: ON-GOING

## 2019-04-01 ASSESSMENT — PAIN DESCRIPTION - PROGRESSION: CLINICAL_PROGRESSION: NOT CHANGED

## 2019-04-01 ASSESSMENT — PAIN - FUNCTIONAL ASSESSMENT: PAIN_FUNCTIONAL_ASSESSMENT: 0-10

## 2019-04-01 NOTE — ED PROVIDER NOTES
I independently performed a history and physical on [facility]. All diagnostic, treatment, and disposition decisions were made by myself in conjunction with the advanced practice provider. For further details of [facility] emergency department encounter, please see HARJINDER Mayorga's documentation. Patient here complaining of nausea and vomiting and pains. She states that she was unable to keep fluids down at home because the Zofran was not controlling her nausea. On exam she is tachycardic and lungs clear to auscultation bilaterally and abdomen benign.     Results for orders placed or performed during the hospital encounter of 04/01/19   CBC Auto Differential   Result Value Ref Range    WBC 6.9 4.0 - 11.0 K/uL    RBC 3.65 (L) 4.00 - 5.20 M/uL    Hemoglobin 11.2 (L) 12.0 - 16.0 g/dL    Hematocrit 34.2 (L) 36.0 - 48.0 %    MCV 93.7 80.0 - 100.0 fL    MCH 30.8 26.0 - 34.0 pg    MCHC 32.9 31.0 - 36.0 g/dL    RDW 15.9 (H) 12.4 - 15.4 %    Platelets 756 585 - 109 K/uL    MPV 7.9 5.0 - 10.5 fL    Neutrophils % 79.4 %    Lymphocytes % 13.4 %    Monocytes % 6.0 %    Eosinophils % 0.3 %    Basophils % 0.9 %    Neutrophils # 5.5 1.7 - 7.7 K/uL    Lymphocytes # 0.9 (L) 1.0 - 5.1 K/uL    Monocytes # 0.4 0.0 - 1.3 K/uL    Eosinophils # 0.0 0.0 - 0.6 K/uL    Basophils # 0.1 0.0 - 0.2 K/uL   Comprehensive Metabolic Panel   Result Value Ref Range    Sodium 140 136 - 145 mmol/L    Potassium 4.0 3.5 - 5.1 mmol/L    Chloride 98 (L) 99 - 110 mmol/L    CO2 24 21 - 32 mmol/L    Anion Gap 18 (H) 3 - 16    Glucose 111 (H) 70 - 99 mg/dL    BUN 13 7 - 20 mg/dL    CREATININE 0.8 0.6 - 1.2 mg/dL    GFR Non-African American >60 >60    GFR African American >60 >60    Calcium 10.1 8.3 - 10.6 mg/dL    Total Protein 8.6 (H) 6.4 - 8.2 g/dL    Alb 4.3 3.4 - 5.0 g/dL    Albumin/Globulin Ratio 1.0 (L) 1.1 - 2.2    Total Bilirubin 0.8 0.0 - 1.0 mg/dL    Alkaline Phosphatase 164 (H) 40 - 129 U/L    ALT 17 10 - 40 U/L    AST 25 15 - 37 U/L

## 2019-04-01 NOTE — ED NOTES
Right hip incision assessed by Bismark GRANDE with RN at bedside. Meplex pulled back for assessment and then put back in place due to patient reports no other dressing with them. Stable and wound appear well approximated without redness or drainage.       Gin Garner RN  04/01/19 [id number]

## 2019-04-01 NOTE — ED NOTES
Spoke to RN-Henny (with [facility] home care) @[phone], would like an update on pt status to prepare for next visit need ([phone])     Elvan Austin Naegele  04/01/19 9066

## 2019-04-01 NOTE — ED PROVIDER NOTES
Cardiovascular: Negative for chest pain. Gastrointestinal: Positive for nausea and vomiting. Negative for abdominal pain. Genitourinary: Positive for decreased urine volume. Musculoskeletal: Positive for arthralgias (right hip). Negative for gait problem and neck pain. Skin: Negative for color change. Neurological: Negative for dizziness and headaches. All other systems reviewed and are negative. Exceptas noted above in the ROS, all other systems were reviewed and negative. PAST MEDICAL HISTORY:     Past Medical History:   Diagnosis Date    Cancer ([facility] [address] 08/2016    ENDOMETRIAL    Hypertension     DURING ONE CHEMO TX         SURGICAL HISTORY:      Past Surgical History:   Procedure Laterality Date    HEMIARTHROPLASTY HIP Right 3/25/2019    RIGHT BIPOLAR HIP REPLACEMENT           Aba Dubois performed by Bharti Moody MD at [address] [address] [address] Right     STENT FROM CA    OTHER SURGICAL HISTORY Right     ureteral stent change         CURRENT MEDICATIONS:       Previous Medications    DIPHENHYDRAMINE (BENADRYL) 25 MG TABLET    Take 1 tablet by mouth every 6 hours as needed for Itching    ENOXAPARIN (LOVENOX) 40 MG/0.4ML INJECTION    Inject 0.4 mLs into the skin daily    LORAZEPAM (ATIVAN) 0.5 MG TABLET    Take 0.5 mg by mouth every 6 hours as needed for Anxiety. NONFORMULARY    daily    NONFORMULARY    daily Vitamin B complex patch    ONDANSETRON (ZOFRAN) 4 MG TABLET    Take 1 tablet by mouth daily as needed for Nausea or Vomiting         ALLERGIES:    Patient has no known allergies. FAMILY HISTORY:     History reviewed. No pertinent family history.        SOCIAL HISTORY:       Social History     Socioeconomic History    Marital status: Single     Spouse name: None    Number of children: None    Years of education: None    Highest education level: None   Occupational History    None   Social Needs    Financial resource strain: None    Food insecurity: Worry: None     Inability: None    Transportation needs:     Medical: None     Non-medical: None   Tobacco Use    Smoking status: Never Smoker    Smokeless tobacco: Never Used   Substance and Sexual Activity    Alcohol use: Yes     Alcohol/week: 0.6 oz     Types: 1 Glasses of wine per week     Comment: rarely    Drug use: No    Sexual activity: None   Lifestyle    Physical activity:     Days per week: None     Minutes per session: None    Stress: None   Relationships    Social connections:     Talks on phone: None     Gets together: None     Attends Oriental orthodox service: None     Active member of club or organization: None     Attends meetings of clubs or organizations: None     Relationship status: None    Intimate partner violence:     Fear of current or ex partner: None     Emotionally abused: None     Physically abused: None     Forced sexual activity: None   Other Topics Concern    None   Social History Narrative    None       SCREENINGS:             PHYSICAL EXAM:       ED Triage Vitals [04/01/19 1346]   BP Temp Temp Source Pulse Resp SpO2 Height Weight   (!) 158/98 98.2 °F (36.8 °C) Oral 125 14 98 % 5' 5\" (1.651 m) 165 lb (74.8 kg)       Physical Exam    CONSTITUTIONAL: Awake and alert. Cooperative. Well-developed. Well-nourished. Non-toxic. No acute distress. HENT: Normocephalic. Atraumatic. External ears normal, without discharge. No nasal discharge. Oropharynx clear. Mucous membranes moist.  EYES: Conjunctiva non-injected. No scleral icterus. PERRL. EOM's grossly intact. NECK: Supple. Normal ROM. CARDIOVASCULAR: Tachycardic with regular rhythm. No Murmer. Intact distal pulses. PULMONARY/CHEST WALL: Effort normal. No tachypnea. Lungs clear to ausculation. ABDOMEN: Normal BS. Soft. Nondistended. No tenderness to palpate. No guarding.   /ANORECTAL: Not assessed  MUSKULOSKELETAL: Right hip: The patient has a bandage, stapled wound running down the lateral aspect of her right hip status post UA Clear Clear    Glucose, Ur Negative Negative mg/dL    Bilirubin Urine MODERATE (A) Negative    Ketones, Urine >=80 (AA) Negative mg/dL    Specific Gravity, UA >=1.030 1.005 - 1.030    Blood, Urine TRACE-INTACT (A) Negative    pH, UA 5.5 5.0 - 8.0    Protein,  (A) Negative mg/dL    Urobilinogen, Urine 0.2 <2.0 E.U./dL    Nitrite, Urine Negative Negative    Leukocyte Esterase, Urine Negative Negative    Microscopic Examination YES     Urine Reflex to Culture Yes     Urine Type Not Specified    Microscopic Urinalysis   Result Value Ref Range    Mucus, UA 1+ (A) /LPF    WBC, UA 6-10 (A) 0 - 5 /HPF    RBC, UA 0-2 0 - 2 /HPF    Epi Cells 3-5 /HPF    Bacteria, UA 1+ (A) /HPF         RADIOLOGY:  All x-ray studies areviewed/reviewed by me. Formal interpretations per the radiologist are as follows:      Xr Hip Right (1 View)    Result Date: 3/25/2019  EXAMINATION: SINGLE XRAY VIEW OF THE RIGHT HIP 3/25/2019 3:37 pm COMPARISON: Single frontal radiograph of the right hip 03/25/2019 at 1:09 p.m. HISTORY: ORDERING SYSTEM PROVIDED HISTORY: post op TECHNOLOGIST PROVIDED HISTORY: Of operative side while in recovery room. Reason for exam:->post op FINDINGS: There are expected postoperative surgical changes of a right total hip replacement including soft tissue swelling and soft tissue gas. In comparison with the prior study time stamped 1:09 p.m. on 03/25/2019, there is a different femoral stem component in place and the orientation of the acetabular component has changed slightly. The surgical hardware appears intact and in good position on the single provided view. No acute fracture or dislocation is identified. Redemonstrated is a chronic appearing deformity of the right inferior pubic ramus. Surgical skin staples overlie the soft tissues of the right thigh. There is partial imaging of a ureteral stent in situ. Surgical changes of a right total hip replacement, as described above.  Please refer to the operative report for additional information. Xr Hip Right (1 View)    Result Date: 3/25/2019  EXAMINATION: SINGLE XRAY VIEW OF THE RIGHT HIP 3/25/2019 1:22 pm COMPARISON: None. HISTORY: ORDERING SYSTEM PROVIDED HISTORY: bipolar TECHNOLOGIST PROVIDED HISTORY: Reason for exam:->bipolar 79-year-old female with recent bipolar right hip arthroplasty hardware placement FINDINGS: Single AP intraoperative view of the right hip. There is right hip arthroplasty hardware present. No significant periprosthetic lucency or acute osseous abnormality identified. Deformity of the inferior right pubic ramus may be related to sequela of remote trauma. Soft tissue gas and edema is seen overlying the right hip and thigh. Partially visualized right-sided ureteral stent and distal pigtail is present. Postoperative changes related to placement of right hip arthroplasty hardware. PROCEDURES:   N/A    CRITICAL CARE TIME:       Due to the immediate potential for life-threatening deterioration due to tachycardia, I spent 30 minutes providing critical care. This time is excluding time spent performing procedures.       CONSULTS:  IP CONSULT TO HOSPITALIST      EMERGENCY DEPARTMENT COURSE and DIFFERENTIAL DIAGNOSIS/MDM:   Vitals:    Vitals:    04/01/19 1908 04/01/19 1957 04/01/19 2009 04/01/19 2018   BP: (!) 165/98 (!) 172/97  (!) 161/93   Pulse: 114 116  112   Resp: 19 18 16   Temp:  99.1 °F (37.3 °C) 100.1 °F (37.8 °C)    TempSrc:  Axillary Rectal    SpO2: 100% 99%  97%   Weight:       Height:           Patient was given the following medications:  Medications   0.9 % sodium chloride bolus (1,000 mLs Intravenous New Bag 4/1/19 1950)   0.9 % sodium chloride bolus (0 mLs Intravenous Stopped 4/1/19 1951)   ondansetron (ZOFRAN) injection 4 mg (4 mg Intravenous Given 4/1/19 1722)   morphine injection 4 mg (4 mg Intravenous Given 4/1/19 1722)   famotidine (PEPCID) injection 20 mg (20 mg Intravenous Given 4/1/19 1900)   traMADol (ULTRAM) tablet 50 mg (50 mg Oral Given 4/1/19 1900)         Patient was evaluated by both myself and Saeid Bear MD.  The patient is here with family reporting right hip pain in the context of surgery on 3/25. However, her bigger issue is that of nausea and vomiting as a result of the recent surgery and pain medication (presumably). She is not having any abdominal pain or bowel changes. She had a normal bowel movement within the last 24 hours. Because of the nausea and vomiting from pain medicine she hasn't been able to stay hydrated nor control the pain adequately and family brought her here. She is tachycardic in the 120s on arrival.  Many things were considered in terms of her tachycardia such as sepsis/infection or dehydration. In the end, the patient's CBC and metabolic panel are unremarkable. Her urine however is concerning for dehydration with greater than 80 ketones. She also 6-10 white blood cells but is not symptomatic and does have a ureteral stent in place. She states her urine always appears a little infected and for that reason we will treat her but will rather culture urine today. The patient has done a total of 3 L of normal saline in the ED and continues to be tachycardic. She received morphine and Zofran for pain and nausea initially. She later received some Pepcid as she was reporting some belching and indigestion and we tried oral tramadol for pain control. Again, most concerning is the patient's persistent tachycardia and concern for dehydration. I will consult the hospitalist for admission. I spoke with Dr. Luan Dickson. We thoroughly discussed the history, physical exam, laboratory and imaging studies, as well as, emergency department course. Based upon that discussion, we've decided to admit [facility] for further observation and evaluation of Melia Hodge's nausea and vomiting and dehydration.   As I have deemed necessary from their history, physical, and studies, I have considered and evaluated Tenisha Lied for the following diagnoses:  Postop infection, sepsis, GI bug, dehydration, electrolyte abnormality, anemia among other considerations. He Cardoza FINAL IMPRESSION:      1. Tachycardia    2. Non-intractable vomiting with nausea, unspecified vomiting type    3. Dehydration    4.  S/P hip replacement, right          DISPOSITION/PLAN:   DISPOSITION     ADMIT           (Please note thatportions of this note were completed with a voice recognition program.  Efforts were made to edit the dictations, but occasionally words are mis-transcribed.)    [address] [facility] PA- (electronicallysigned)              [place], [place]  04/01/19 9361

## 2019-04-02 LAB
A/G RATIO: 1.1 (ref 1.1–2.2)
ALBUMIN SERPL-MCNC: 3.4 G/DL (ref 3.4–5)
ALP BLD-CCNC: 113 U/L (ref 40–129)
ALT SERPL-CCNC: 12 U/L (ref 10–40)
ANION GAP SERPL CALCULATED.3IONS-SCNC: 12 MMOL/L (ref 3–16)
AST SERPL-CCNC: 17 U/L (ref 15–37)
BASOPHILS ABSOLUTE: 0 K/UL (ref 0–0.2)
BASOPHILS RELATIVE PERCENT: 0.6 %
BILIRUB SERPL-MCNC: 0.5 MG/DL (ref 0–1)
BUN BLDV-MCNC: 8 MG/DL (ref 7–20)
CALCIUM SERPL-MCNC: 8.7 MG/DL (ref 8.3–10.6)
CHLORIDE BLD-SCNC: 104 MMOL/L (ref 99–110)
CO2: 24 MMOL/L (ref 21–32)
CREAT SERPL-MCNC: 0.6 MG/DL (ref 0.6–1.2)
EOSINOPHILS ABSOLUTE: 0.1 K/UL (ref 0–0.6)
EOSINOPHILS RELATIVE PERCENT: 2.1 %
GFR AFRICAN AMERICAN: >60
GFR NON-AFRICAN AMERICAN: >60
GLOBULIN: 3.2 G/DL
GLUCOSE BLD-MCNC: 94 MG/DL (ref 70–99)
HCT VFR BLD CALC: 25.6 % (ref 36–48)
HEMOGLOBIN: 8.5 G/DL (ref 12–16)
LYMPHOCYTES ABSOLUTE: 0.9 K/UL (ref 1–5.1)
LYMPHOCYTES RELATIVE PERCENT: 16.8 %
MCH RBC QN AUTO: 31.2 PG (ref 26–34)
MCHC RBC AUTO-ENTMCNC: 33.2 G/DL (ref 31–36)
MCV RBC AUTO: 94.1 FL (ref 80–100)
MONOCYTES ABSOLUTE: 0.4 K/UL (ref 0–1.3)
MONOCYTES RELATIVE PERCENT: 7.4 %
NEUTROPHILS ABSOLUTE: 3.8 K/UL (ref 1.7–7.7)
NEUTROPHILS RELATIVE PERCENT: 73.1 %
PDW BLD-RTO: 16 % (ref 12.4–15.4)
PLATELET # BLD: 250 K/UL (ref 135–450)
PMV BLD AUTO: 7.2 FL (ref 5–10.5)
POTASSIUM REFLEX MAGNESIUM: 3.7 MMOL/L (ref 3.5–5.1)
RBC # BLD: 2.72 M/UL (ref 4–5.2)
SODIUM BLD-SCNC: 140 MMOL/L (ref 136–145)
TOTAL PROTEIN: 6.6 G/DL (ref 6.4–8.2)
WBC # BLD: 5.2 K/UL (ref 4–11)

## 2019-04-02 PROCEDURE — 6360000002 HC RX W HCPCS: Performed by: NURSE PRACTITIONER

## 2019-04-02 PROCEDURE — 2580000003 HC RX 258: Performed by: INTERNAL MEDICINE

## 2019-04-02 PROCEDURE — 97116 GAIT TRAINING THERAPY: CPT

## 2019-04-02 PROCEDURE — 80053 COMPREHEN METABOLIC PANEL: CPT

## 2019-04-02 PROCEDURE — 6370000000 HC RX 637 (ALT 250 FOR IP): Performed by: INTERNAL MEDICINE

## 2019-04-02 PROCEDURE — APPSS30 APP SPLIT SHARED TIME 16-30 MINUTES: Performed by: PHYSICIAN ASSISTANT

## 2019-04-02 PROCEDURE — 85025 COMPLETE CBC W/AUTO DIFF WBC: CPT

## 2019-04-02 PROCEDURE — 97530 THERAPEUTIC ACTIVITIES: CPT

## 2019-04-02 PROCEDURE — 36415 COLL VENOUS BLD VENIPUNCTURE: CPT

## 2019-04-02 PROCEDURE — C9113 INJ PANTOPRAZOLE SODIUM, VIA: HCPCS | Performed by: INTERNAL MEDICINE

## 2019-04-02 PROCEDURE — 6360000002 HC RX W HCPCS: Performed by: INTERNAL MEDICINE

## 2019-04-02 PROCEDURE — 97165 OT EVAL LOW COMPLEX 30 MIN: CPT

## 2019-04-02 PROCEDURE — 1200000000 HC SEMI PRIVATE

## 2019-04-02 PROCEDURE — 97161 PT EVAL LOW COMPLEX 20 MIN: CPT

## 2019-04-02 RX ORDER — METOPROLOL SUCCINATE 25 MG/1
25 TABLET, EXTENDED RELEASE ORAL DAILY
Status: DISCONTINUED | OUTPATIENT
Start: 2019-04-02 | End: 2019-04-03 | Stop reason: HOSPADM

## 2019-04-02 RX ORDER — ONDANSETRON 2 MG/ML
4 INJECTION INTRAMUSCULAR; INTRAVENOUS ONCE
Status: DISCONTINUED | OUTPATIENT
Start: 2019-04-02 | End: 2019-04-02

## 2019-04-02 RX ORDER — ONDANSETRON 2 MG/ML
8 INJECTION INTRAMUSCULAR; INTRAVENOUS ONCE
Status: COMPLETED | OUTPATIENT
Start: 2019-04-02 | End: 2019-04-02

## 2019-04-02 RX ORDER — DIPHENHYDRAMINE HCL 25 MG
25 TABLET ORAL EVERY 6 HOURS PRN
Status: DISCONTINUED | OUTPATIENT
Start: 2019-04-02 | End: 2019-04-03 | Stop reason: HOSPADM

## 2019-04-02 RX ORDER — PANTOPRAZOLE SODIUM 40 MG/10ML
40 INJECTION, POWDER, LYOPHILIZED, FOR SOLUTION INTRAVENOUS DAILY
Status: DISCONTINUED | OUTPATIENT
Start: 2019-04-02 | End: 2019-04-03 | Stop reason: HOSPADM

## 2019-04-02 RX ORDER — TRAMADOL HYDROCHLORIDE 50 MG/1
100 TABLET ORAL EVERY 6 HOURS PRN
Status: DISCONTINUED | OUTPATIENT
Start: 2019-04-02 | End: 2019-04-03 | Stop reason: HOSPADM

## 2019-04-02 RX ORDER — PROMETHAZINE HYDROCHLORIDE 25 MG/ML
12.5 INJECTION, SOLUTION INTRAMUSCULAR; INTRAVENOUS EVERY 6 HOURS PRN
Status: DISCONTINUED | OUTPATIENT
Start: 2019-04-02 | End: 2019-04-03 | Stop reason: HOSPADM

## 2019-04-02 RX ORDER — SODIUM PHOSPHATE,MONO-DIBASIC 19G-7G/118
1 ENEMA (ML) RECTAL ONCE
Status: DISCONTINUED | OUTPATIENT
Start: 2019-04-02 | End: 2019-04-03 | Stop reason: HOSPADM

## 2019-04-02 RX ORDER — SENNA AND DOCUSATE SODIUM 50; 8.6 MG/1; MG/1
2 TABLET, FILM COATED ORAL 2 TIMES DAILY
Status: DISCONTINUED | OUTPATIENT
Start: 2019-04-02 | End: 2019-04-03 | Stop reason: HOSPADM

## 2019-04-02 RX ORDER — SODIUM CHLORIDE 9 MG/ML
INJECTION, SOLUTION INTRAVENOUS CONTINUOUS
Status: DISCONTINUED | OUTPATIENT
Start: 2019-04-02 | End: 2019-04-03

## 2019-04-02 RX ORDER — TRAMADOL HYDROCHLORIDE 50 MG/1
50 TABLET ORAL ONCE
Status: DISCONTINUED | OUTPATIENT
Start: 2019-04-02 | End: 2019-04-03 | Stop reason: HOSPADM

## 2019-04-02 RX ORDER — TRAMADOL HYDROCHLORIDE 50 MG/1
50 TABLET ORAL EVERY 6 HOURS PRN
Status: DISCONTINUED | OUTPATIENT
Start: 2019-04-02 | End: 2019-04-03 | Stop reason: HOSPADM

## 2019-04-02 RX ORDER — POLYETHYLENE GLYCOL 3350 17 G/17G
17 POWDER, FOR SOLUTION ORAL DAILY
Status: DISCONTINUED | OUTPATIENT
Start: 2019-04-02 | End: 2019-04-03 | Stop reason: HOSPADM

## 2019-04-02 RX ORDER — KETOROLAC TROMETHAMINE 30 MG/ML
15 INJECTION, SOLUTION INTRAMUSCULAR; INTRAVENOUS ONCE
Status: COMPLETED | OUTPATIENT
Start: 2019-04-02 | End: 2019-04-02

## 2019-04-02 RX ORDER — OXYCODONE HYDROCHLORIDE AND ACETAMINOPHEN 5; 325 MG/1; MG/1
1 TABLET ORAL ONCE
Status: DISCONTINUED | OUTPATIENT
Start: 2019-04-02 | End: 2019-04-03 | Stop reason: HOSPADM

## 2019-04-02 RX ADMIN — KETOROLAC TROMETHAMINE 15 MG: 30 INJECTION, SOLUTION INTRAMUSCULAR; INTRAVENOUS at 22:21

## 2019-04-02 RX ADMIN — ONDANSETRON 4 MG: 2 INJECTION INTRAMUSCULAR; INTRAVENOUS at 20:17

## 2019-04-02 RX ADMIN — ENOXAPARIN SODIUM 40 MG: 40 INJECTION SUBCUTANEOUS at 09:57

## 2019-04-02 RX ADMIN — ONDANSETRON 4 MG: 2 INJECTION INTRAMUSCULAR; INTRAVENOUS at 06:02

## 2019-04-02 RX ADMIN — TRAMADOL HYDROCHLORIDE 100 MG: 50 TABLET, FILM COATED ORAL at 09:58

## 2019-04-02 RX ADMIN — LORAZEPAM 0.5 MG: 0.5 TABLET ORAL at 14:51

## 2019-04-02 RX ADMIN — SODIUM CHLORIDE: 9 INJECTION, SOLUTION INTRAVENOUS at 04:22

## 2019-04-02 RX ADMIN — ONDANSETRON 4 MG: 2 INJECTION INTRAMUSCULAR; INTRAVENOUS at 12:45

## 2019-04-02 RX ADMIN — SODIUM CHLORIDE: 9 INJECTION, SOLUTION INTRAVENOUS at 12:45

## 2019-04-02 RX ADMIN — ONDANSETRON 8 MG: 2 INJECTION INTRAMUSCULAR; INTRAVENOUS at 22:21

## 2019-04-02 RX ADMIN — SODIUM CHLORIDE, PRESERVATIVE FREE 10 ML: 5 INJECTION INTRAVENOUS at 09:57

## 2019-04-02 RX ADMIN — PANTOPRAZOLE SODIUM 40 MG: 40 INJECTION, POWDER, FOR SOLUTION INTRAVENOUS at 09:56

## 2019-04-02 RX ADMIN — PROMETHAZINE HYDROCHLORIDE 12.5 MG: 25 INJECTION INTRAMUSCULAR; INTRAVENOUS at 14:43

## 2019-04-02 RX ADMIN — METOPROLOL SUCCINATE 25 MG: 25 TABLET, EXTENDED RELEASE ORAL at 14:47

## 2019-04-02 ASSESSMENT — PAIN DESCRIPTION - PAIN TYPE: TYPE: SURGICAL PAIN

## 2019-04-02 ASSESSMENT — PAIN SCALES - GENERAL
PAINLEVEL_OUTOF10: 3
PAINLEVEL_OUTOF10: 2
PAINLEVEL_OUTOF10: 5
PAINLEVEL_OUTOF10: 5
PAINLEVEL_OUTOF10: 6

## 2019-04-02 ASSESSMENT — PAIN DESCRIPTION - ORIENTATION: ORIENTATION: RIGHT

## 2019-04-02 ASSESSMENT — PAIN DESCRIPTION - LOCATION: LOCATION: HIP

## 2019-04-02 NOTE — PROGRESS NOTES
Occupational Therapy   Occupational Therapy Initial Assessment/Treatment/Discharge Summary  1x only  Date: 2019   Patient Name: Scarlet Rodriguez  MRN: [id number]     : 1955    Date of Service: 2019    Discharge Recommendations:  Home with assist PRN       Assessment   Performance deficits / Impairments: Decreased functional mobility   Patient admitted with dehydration, Trista for mobility with SW and supervision for ADLs. No further OT needs at this time, will sign off. Prognosis: Good  Decision Making: Low Complexity  Patient Education: ADLs, bed mobility, functional transfers and role of OT  No Skilled OT: At baseline function; No OT goals identified  REQUIRES OT FOLLOW UP: No  Activity Tolerance  Activity Tolerance: Patient Tolerated treatment well  Safety Devices  Safety Devices in place: Yes  Type of devices: Left in bed;Call light within reach;Nurse notified           Patient Diagnosis(es): The primary encounter diagnosis was Tachycardia. Diagnoses of Non-intractable vomiting with nausea, unspecified vomiting type, Dehydration, and S/P hip replacement, right were also pertinent to this visit. has a past medical history of Cancer ([facility] [address] and Hypertension. has a past surgical history that includes Hysterectomy; Kidney surgery (Right); other surgical history (Right); and HEMIARTHROPLASTY HIP (Right, 3/25/2019).            Restrictions  Restrictions/Precautions  Restrictions/Precautions: Weight Bearing  Lower Extremity Weight Bearing Restrictions  Right Lower Extremity Weight Bearing: Weight Bearing As Tolerated  Position Activity Restriction  Hip Precautions: No hip external rotation, No ABduction, No hip extension  Other position/activity restrictions: S/p R bipolar hip replacement 3/25/19    Subjective   General  Chart Reviewed: Yes  Patient assessed for rehabilitation services?: Yes  Additional Pertinent Hx: R hip pathological fx, s/p R anterior THR 3/25/19  Family / Caregiver Present: Normotonic  Coordination  Movements Are Fluid And Coordinated: Yes     Bed mobility  Supine to Sit: Modified independent  Sit to Supine: Modified independent  Transfers  Sit to stand: Modified independent(up to SW)  Stand to sit: Modified independent     Cognition  Overall Cognitive Status: WFL        Sensation  Overall Sensation Status: WFL        LUE AROM (degrees)  LUE AROM : WFL  RUE AROM (degrees)  RUE AROM : WFL  LUE Strength  Gross LUE Strength: WFL  RUE Strength  Gross RUE Strength: WFL      Plan   Plan  Times per week: 1x only                        AM-PAC Score        AM-[facility] Inpatient Daily Activity Raw Score: 20  AM-PAC Inpatient ADL T-Scale Score : 42.03  ADL Inpatient CMS 0-100% Score: 38.32  ADL Inpatient CMS G-Code Modifier : CJ    Goals  Short term goals  Time Frame for Short term goals: 1 session  Short term goal 1: Pt. will complete functional transfers with Trista-STG met 4/2  Patient Goals   Patient goals : \"to return home and get to outpatient therapy\"       Therapy Time   Individual Concurrent Group Co-treatment   Time In 1123         Time Out 1145         Minutes 22         Timed Code Treatment Minutes: 12 Minutes       Kendal Mena OT

## 2019-04-02 NOTE — PROGRESS NOTES
Physical Therapy    Facility/Department: [place] [address] - MED SURG/ORTHO  Initial Assessment    NAME: Skyler Tovar  : 1955  MRN: [id number]    Date of Service: 2019    Discharge Recommendations:  Home with assist PRN, Outpatient PT   PT Equipment Recommendations  Equipment Needed: No    Assessment   Body structures, Functions, Activity limitations: Decreased strength;Decreased functional mobility   Assessment: Patient is a 62 y/o female admitted for dehydration following R bipolar hip replacement one week ago. Pt is mod I for transfers and ambulation. Pt would benefit from 1-2 PT sessions while hospitalized to address stairs and LE strength. Follow up with OP PT at discharge. Treatment Diagnosis: impaired strength  Prognosis: Good  Decision Making: Low Complexity  Patient Education: Educated on safety with mobility and use of call light  REQUIRES PT FOLLOW UP: Yes  Activity Tolerance  Activity Tolerance: Patient Tolerated treatment well;Patient limited by fatigue       Patient Diagnosis(es): The primary encounter diagnosis was Tachycardia. Diagnoses of Non-intractable vomiting with nausea, unspecified vomiting type, Dehydration, and S/P hip replacement, right were also pertinent to this visit. has a past medical history of Cancer ([address] and Hypertension. has a past surgical history that includes Hysterectomy; Kidney surgery (Right); other surgical history (Right); and HEMIARTHROPLASTY HIP (Right, 3/25/2019).     Restrictions  Restrictions/Precautions  Restrictions/Precautions: Weight Bearing  Lower Extremity Weight Bearing Restrictions  Right Lower Extremity Weight Bearing: Weight Bearing As Tolerated  Position Activity Restriction  Hip Precautions: No hip external rotation, No ABduction, No hip extension  Other position/activity restrictions: S/p R bipolar hip replacement 3/25/19  Subjective  General  Chart Reviewed: Yes  Patient assessed for rehabilitation services?: Yes  Family / Caregiver Present: No  Referring Practitioner: Davina Ramey MD  Referral Date : 04/02/19  Diagnosis: dehydration  Follows Commands: Within Functional Limits  General Comment  Comments: Pt supine in bed upon arrival. RN Cleared pt for therapy  Subjective  Subjective: Pt agreeable to evaluation. Reports feeling very tired and is not up to doing much.   Pain Screening  Patient Currently in Pain: No  Vital Signs  Patient Currently in Pain: No     Social/Functional History  Social/Functional History  Lives With: Spouse  Type of Home: House  Home Layout: Two level, Able to Live on Main level with bedroom/bathroom  Home Access: Stairs to enter with rails  Entrance Stairs - Number of Steps: 2 LYUDMILA  Entrance Stairs - Rails: Right(ascending)  Bathroom Shower/Tub: Walk-in shower  Bathroom Toilet: Handicap height(in masterbathroom)  Bathroom Equipment: Toilet raiser(on toilet in hallway)  Home Equipment: Cane, 4 wheeled walker, Standard walker  Receives Help From: Family, Other (comment)(family assists with homemaking, receives assist with home management throughout the week from [employer] girls)  ADL Assistance: Independent  Homemaking Assistance: Needs assistance( doing majority of homemaking for pt since CA dx)  Ambulation Assistance: Independent(with SW)  Transfer Assistance: Independent  Active : Yes  Occupation: Retired  Leisure & Hobbies: eating with friends  Objective     Strength RLE  Strength RLE: Exception  Comment: grossly 3+/5 at hip  Strength LLE  Strength LLE: WFL     Sensation  Overall Sensation Status: WFL  Bed mobility  Supine to Sit: Modified independent  Sit to Supine: Modified independent  Transfers  Sit to Stand: Modified independent  Stand to sit: Modified independent  Ambulation  Ambulation?: Yes  Ambulation 1  Surface: level tile  Device: Standard Walker  Assistance: Modified Independent  Quality of Gait: step to gait pattern, decreased amparo, steady with turns  Distance: 40'  Comments: distance limited by fatigue  Stairs/Curb  Stairs?: No     Balance  Sitting - Static: Good  Sitting - Dynamic: Good  Standing - Static: Good;-  Standing - Dynamic: Good;-      Plan   Plan  Times per week: 2-3x/wk  Current Treatment Recommendations: Strengthening, Functional Mobility Training, Gait Training, Stair training, Patient/Caregiver Education & Training  Safety Devices  Type of devices: Call light within reach, Left in bed, Nurse notified    AM-PAC Score  AM-PAC Inpatient Mobility Raw Score : 23  AM-PAC Inpatient T-Scale Score : 56.93  Mobility Inpatient CMS 0-100% Score: 11.2  Mobility Inpatient CMS G-Code Modifier : CI        Goals  Short term goals  Time Frame for Short term goals: 5 days  Short term goal 1: Pt will ambulate 150' with appropriate AD mod I  Short term goal 2: Pt will negotiate 2 steps with appropriate AD and supervision  Short term goal 3: By 4/4/19, pt will be independent with LE ther ex following THR protocol  Patient Goals   Patient goals : \"to start feeling better\"     Therapy Time   Individual Concurrent Group Co-treatment   Time In 1123         Time Out 1145         Minutes 22         Timed Code Treatment Minutes: Darrell Todd [address], [address] [facility] [id number]

## 2019-04-02 NOTE — PLAN OF CARE
..Bed in lowest position, wheels locked, 2/4 side rails up, nonskid footwear on. Bed/ chair check alarm in place, call light within reach. Pt instructed to call out when needing assistance. Pt stated understanding. Nurse will continue to monitor. Jasper Blanco .Pt scoring pain on 0-10 scale. Pain medications given per MAR. Pt instructed to call out when pain level increasing. Call light within reach. Nurse will continue to reassess and monitor.

## 2019-04-02 NOTE — PROGRESS NOTES
Sent perfect serve to JEAN MARIE Blanchard regardings patient requests. Sent perfect serve to Dr. Maryellen Bond to verify orders.

## 2019-04-02 NOTE — PLAN OF CARE
Pt remains free from fall and injury. Non-skid slippers on. Fall risk band on wrist, bed alarm in use. Instructed to call for assistance. Call light in reach, will monitor. Pt satisfied with current pain medication regimen.

## 2019-04-02 NOTE — DISCHARGE INSTR - COC
Weight:   Wt Readings from Last 1 Encounters:   04/01/19 165 lb (74.8 kg)     Mental Status:  oriented and alert    IV Access:  - None    Nursing Mobility/ADLs:  Walking   Assisted  Transfer  Assisted  Bathing  Assisted  Dressing  Assisted  Toileting  Independent  Feeding  [address]  Assisted  Med Delivery   whole    Wound Care Documentation and Therapy:        Elimination:  Continence:   · Bowel: Yes  · Bladder: Yes  Urinary Catheter: None   Colostomy/Ileostomy/Ileal Conduit: No       Date of Last BM: 4/2/19    Intake/Output Summary (Last 24 hours) at 4/2/2019 1511  Last data filed at 4/2/2019 1456  Gross per 24 hour   Intake 4040 ml   Output 500 ml   Net 3540 ml     I/O last 3 completed shifts: In: 2703 [P.O.:240; I.V.:800; IV Piggyback:3000]  Out: 500 [Urine:500]    Safety Concerns: At Risk for Falls    Impairments/Disabilities:      None    Nutrition Therapy:  Current Nutrition Therapy:   - Oral Diet:  General    Routes of Feeding: Oral  Liquids: Thin Liquids  Daily Fluid Restriction: no  Last Modified Barium Swallow with Video (Video Swallowing Test): not done    Treatments at the Time of Hospital Discharge:   Respiratory Treatments: ***  Oxygen Therapy:  is not on home oxygen therapy.   Ventilator:    - No ventilator support    Rehab Therapies: Physical Therapy and Occupational Therapy  Weight Bearing Status/Restrictions: No weight bearing restirctions  Other Medical Equipment (for information only, NOT a DME order):  wheelchair and walker  Other Treatments: ***    Patient's personal belongings (please select all that are sent with patient):  {[facility] DME Belongings:922069983}    RN SIGNATURE:  Electronically signed by aMria Elena Hatch RN on 4/3/19 at 4:59 PM    CASE MANAGEMENT/SOCIAL WORK SECTION    Inpatient Status Date: 4/1/19    Readmission Risk Assessment Score:  Readmission Risk              Risk of Unplanned Readmission:        12           Discharging to Facility/ Agency   · Name: [facility]   · Address:  · Phone:  · Fax:    Dialysis Facility (if applicable)   · Name:  · Address:  · Dialysis Schedule:  · Phone:  · Fax:    / signature: {Esignature:[id number]}    PHYSICIAN SECTION    Prognosis: {Prognosis:3748788872}    Condition at Discharge: Osiel Collins Patient Condition:[id number]}    Rehab Potential (if transferring to Rehab): {Prognosis:4395365410}    Recommended Labs or Other Treatments After Discharge: ***    Physician Certification: I certify the above information and transfer of Aniyah Levine  is necessary for the continuing treatment of the diagnosis listed and that she requires {Admit to Appropriate Level of Care:85795} for {GREATER/LESS:661129723} 30 days.      Update Admission H&P: {CHP DME Changes in USYEK:525216536}    PHYSICIAN SIGNATURE:  {Esignature:[id number]}

## 2019-04-02 NOTE — PROGRESS NOTES
Patient assessment complete and charted. VSS. AOx4. Patient currently in pain. Requesting pain medication after breakfast. Family at bedside. Bed locked and in lowest position. Non-skid socks in place. Call light within reach. Bed alarm on. Patient states no further needs at this time. Will continue to monitor.

## 2019-04-02 NOTE — PLAN OF CARE
..Bed in lowest position, wheels locked, 2/4 side rails up, nonskid footwear on. Bed/ chair check alarm in place, call light within reach. Pt instructed to call out when needing assistance. Pt stated understanding. Nurse will continue to monitor. Jhoanora Sis .Pt scoring pain on 0-10 scale. Pain medications given per MAR. Pt instructed to call out when pain level increasing. Call light within reach. Nurse will continue to reassess and monitor.

## 2019-04-02 NOTE — PROGRESS NOTES
4 Eyes Skin Assessment     The patient is being assess for   Admission    I agree that 2 RN's have performed a thorough Head to Toe Skin Assessment on the patient. ALL assessment sites listed below have been assessed. Areas assessed by both nurses:   [x]   Head, Face, and Ears   [x]   Shoulders, Back, and Chest, Abdomen  [x]   Arms, Elbows, and Hands   [x]   Coccyx, Sacrum, and Ischium  []   Legs, Feet, and Heels        Sx incision to Right hip, minimal drainage with some bruising and swelling to ambrocio wound. **SHARE this note so that the co-signing nurse is able to place an eSignature**    Co-signer eSignature: Electronically signed by Debo Gomez RN on 4/1/19 at 9:37 PM    Does the Patient have Skin Breakdown?   No          Rich Prevention initiated:  No   Wound Care Orders initiated:  No      [facility] nurse consulted for Pressure Injury (Stage 3,4, Unstageable, DTI, NWPT, Complex wounds)and New or Established Ostomies:  No      Primary Nurse eSignature: Electronically signed by Oly Rousseau on 4/2/19 at 2:55 AM

## 2019-04-02 NOTE — CONSULTS
Department of Orthopedic Surgery  Physician Assistant   Consult Note        Reason for Consult:  S/P right hip bipolar  Requesting Physician: Lesli Holt MD  Date of Service: 4/2/2019 11:41 AM    CHIEF COMPLAINT:  As Above    History Obtained From:  patient    HISTORY OF PRESENT ILLNESS:                The patient is a 61 y.o. female who presents with above chief complaint. Pt states she has been doing well after bipolar last week but has started having a lot of N/V and constipation. States typically the only pain medication she tolerates in ibuprofen. Feels a little better today. Currently on ultram after percocet  stoped yesterday. No hip complaints. Past Medical History:        Diagnosis Date    Cancer ([address] 08/2016    ENDOMETRIAL    Hypertension     DURING ONE CHEMO [place]     Past Surgical History:        Procedure Laterality Date    HEMIARTHROPLASTY HIP Right 3/25/2019    RIGHT BIPOLAR HIP REPLACEMENT           [facility] performed by Bandar Lozano MD at [address] [address] [address] Right     STENT FROM [place]    OTHER SURGICAL HISTORY Right     ureteral stent change         Medications Prior to Admission:   Prior to Admission medications    Medication Sig Start Date End Date Taking? Authorizing Provider   ondansetron (ZOFRAN) 4 MG tablet Take 1 tablet by mouth daily as needed for Nausea or Vomiting 3/27/19   HARJINDER Villalobos   enoxaparin (LOVENOX) 40 MG/0.4ML injection Inject 0.4 mLs into the skin daily 3/27/19   AGUSTIN Conrad CNP   diphenhydrAMINE (BENADRYL) 25 MG tablet Take 1 tablet by mouth every 6 hours as needed for Itching 3/26/19 4/25/19  AGUSTIN Conrad CNP   LORazepam (ATIVAN) 0.5 MG tablet Take 0.5 mg by mouth every 6 hours as needed for Anxiety. Historical Provider, MD   NONFORMULARY daily Vitamin B complex patch    Historical Provider, MD   NONFORMULARY daily    Historical Provider, MD       Allergies:  Patient has no known allergies.     Social History:    Tobacco:  reports that she has never smoked. She has never used smokeless tobacco.   Alcohol:  reports that she drinks about 0.6 oz of alcohol per week. Illicit Drug: No  Family History:   History reviewed. No pertinent family history. REVIEW OF SYSTEMS:    CONSTITUTIONAL:  negative  MUSCULOSKELETAL:  positive for  pain    PHYSICAL EXAM:    awake, alert, cooperative, no apparent distress, and appears stated age  MUSCULOSKELETAL:  there is no redness, warmth, or swelling of the joints  full range of motion noted  motor strength is 5 out of 5 all extremities bilaterally  tone is normal  with exception of  RIGHT HIP:  redness absent  warmth absent  swelling present  tenderness absent  incision site clean, dry and intact      DATA:    CBC:   Recent Labs     04/01/19  1300 04/02/19  0545   WBC 6.9 5.2   HGB 11.2* 8.5*    250     BMP:    Recent Labs     04/01/19  1300 04/02/19  0545    140   K 4.0 3.7   CL 98* 104   CO2 24 24   BUN 13 8   CREATININE 0.8 0.6   GLUCOSE 111* 94     INR: No results for input(s): INR in the last 72 hours. Radiology:   XR ABDOMEN (KUB) (SINGLE AP VIEW)    (Results Pending)          IMPRESSION/RECOMMENDATIONS:    Assessment: s/p right bipolar hip replacement, n/v    Plan:  1) continue with IM recs for n/v and constipation. Hip looks good at this time and nothing further needed from ortho standpoint at this time. Will continue to follow peripherally but okay to d/c from our end. Will f/u as scheduled in the office. Thank you for the opportunity to consult on this patient.     Alondra Teran

## 2019-04-02 NOTE — H&P
Hospital Medicine History & Physical      [id number]    Date of Admission: 4/1/2019    Date of Service: Pt seen/examined on 4/1/2019 and Admitted to Inpatient with expected LOS greater than two midnights due to medical therapy. Chief Complaint:  Nausea vomiting      History Of Present Illness:     61 y.o. female who presented to [facility] with his treatment of nausea and vomiting and generalized malaise. Patient just recently had a right bipolar hip replacement after being diagnosed with right pathological femoral neck fracture. Patient has a history of uterine cancer with resection. Patient was sent home on pain medications Percocet which she states normally causes moderate nausea. She was doing okay on the Percocet in the hospital with the IV Zofran but when she went home and took Percocet with oral Zofran and her nausea was unremitting. She has been unable to keep anything down for the last several days and has been very weak. She's been extremely constipated with no bowel movement over 8-9 days but did have a bowel movement today.   At the urging of her family and after discussion with her surgeon Dr. Mcduffie Fearing she decided to come back to the hospital for care    Patient states that her pain in the hip has been more as she has not been able to keep much of the Percocet down and is only been taking rectal Tylenol  She's had low-grade fever    Past Medical History:          Diagnosis Date    Cancer ([address] 08/2016    ENDOMETRIAL    Hypertension     DURING ONE CHEMO [place]       Past Surgical History:          Procedure Laterality Date    HEMIARTHROPLASTY HIP Right 3/25/2019    RIGHT BIPOLAR HIP REPLACEMENT           [facility] performed by Miles Davis MD at [address] [place] [address] Right     STENT FROM [place]    OTHER SURGICAL HISTORY Right     ureteral stent change       Medications Prior to Admission:      Prior to Admission medications    Medication Sig Start Date End Date Taking? Authorizing Provider   ondansetron (ZOFRAN) 4 MG tablet Take 1 tablet by mouth daily as needed for Nausea or Vomiting 3/27/19   HARJINDER Carter   enoxaparin (LOVENOX) 40 MG/0.4ML injection Inject 0.4 mLs into the skin daily 3/27/19   AGUSTIN Meyer CNP   diphenhydrAMINE (BENADRYL) 25 MG tablet Take 1 tablet by mouth every 6 hours as needed for Itching 3/26/19 4/25/19  AGUSTIN Meyer - CNP   LORazepam (ATIVAN) 0.5 MG tablet Take 0.5 mg by mouth every 6 hours as needed for Anxiety. Historical Provider, MD   NONFORMULARY daily Vitamin B complex patch    Historical Provider, MD   NONFORMULARY daily    Historical Provider, MD       Allergies:  Patient has no known allergies. Social History:      The patient currently lives with her family in [facility]  She is  with 2 daughters    TOBACCO:   reports that she has never smoked. She has never used smokeless tobacco.  ETOH:   reports that she drinks about 0.6 oz of alcohol per week. Family History:       Reviewed in detail and negative for DM, CAD, Cancer, CVA. Positive as follows:    History reviewed. No pertinent family history. REVIEW OF SYSTEMS:   Pertinent positives as noted in the HPI. All other systems reviewed and negative. PHYSICAL EXAM PERFORMED:    BP (!) 161/93   Pulse 112   Temp 100.1 °F (37.8 °C) (Rectal)   Resp 16   Ht 5' 5\" (1.651 m)   Wt 165 lb (74.8 kg)   SpO2 97%   BMI 27.46 kg/m²     General appearance:  No apparent distress, appears stated age and cooperative. HEENT:  Normal cephalic, atraumatic without obvious deformity. Pupils equal, round, and reactive to light. Extra ocular muscles intact. Conjunctivae/corneas clear. Cavity dry tacky mucous membranes  Neck: Supple, with full range of motion. No jugular venous distention. Trachea midline. Respiratory:  Normal respiratory effort. Clear to auscultation, bilaterally without Rales/Wheezes/Rhonchi.   Cardiovascular:  Sinus tachycardia  Abdomen: Soft, non-tender, non-distended with normal bowel sounds. Musculoskeletal:  Right hip site mildly erythematous with no drainage  Skin: Skin color, texture, turgor normal.  No rashes or lesions. Neurologic:  Neurovascularly intact without any focal sensory/motor deficits. Cranial nerves: II-XII intact, grossly non-focal.  Psychiatric:  Alert and oriented, thought content appropriate, normal insight  Capillary Refill: Brisk,< 3 seconds   Peripheral Pulses: +2 palpable, equal bilaterally       Labs:     Recent Labs     04/01/19  1300   WBC 6.9   HGB 11.2*   HCT 34.2*        Recent Labs     04/01/19  1300      K 4.0   CL 98*   CO2 24   BUN 13   CREATININE 0.8   CALCIUM 10.1     Recent Labs     04/01/19  1300   AST 25   ALT 17   BILITOT 0.8   ALKPHOS 164*     No results for input(s): INR in the last 72 hours. No results for input(s): Freddy Mylar in the last 72 hours. Urinalysis:      Lab Results   Component Value Date    NITRU Negative 04/01/2019    WBCUA 6-10 04/01/2019    BACTERIA 1+ 04/01/2019    RBCUA 0-2 04/01/2019    BLOODU TRACE-INTACT 04/01/2019    SPECGRAV >=1.030 04/01/2019    GLUCOSEU Negative 04/01/2019       Radiology:         No orders to display       ASSESSMENT/PLAN    Dehydration due to intractable nausea and vomiting  Nausea and vomiting as adverse side effect of her Percocet. Patient reports that this also happens with Vicodin. I suggest we try her on Ultram and if this is unsuccessful then oral Dilaudid might work. continue IV fluid hydration normal saline at 125 ML's per hour      S/p recent bipolar hip replacement-site looks good  Continue her PT OT ordered for am  Ortho consulted they can see her in am            DVT Prophylaxis: lovenox  Diet: DIET GENERAL;  Code Status: Full Code    PT/OT Eval Status: ordered    Dispo - 3-4 d       Liana Murguia MD    Thank you Patrick Frye for the opportunity to be involved in this patient's care.  If

## 2019-04-03 VITALS
WEIGHT: 165 LBS | HEART RATE: 80 BPM | HEIGHT: 65 IN | TEMPERATURE: 98.4 F | RESPIRATION RATE: 16 BRPM | OXYGEN SATURATION: 98 % | BODY MASS INDEX: 27.49 KG/M2 | SYSTOLIC BLOOD PRESSURE: 150 MMHG | DIASTOLIC BLOOD PRESSURE: 75 MMHG

## 2019-04-03 LAB — URINE CULTURE, ROUTINE: NORMAL

## 2019-04-03 PROCEDURE — 6360000002 HC RX W HCPCS: Performed by: INTERNAL MEDICINE

## 2019-04-03 PROCEDURE — 6370000000 HC RX 637 (ALT 250 FOR IP): Performed by: INTERNAL MEDICINE

## 2019-04-03 PROCEDURE — 2580000003 HC RX 258: Performed by: INTERNAL MEDICINE

## 2019-04-03 PROCEDURE — C9113 INJ PANTOPRAZOLE SODIUM, VIA: HCPCS | Performed by: INTERNAL MEDICINE

## 2019-04-03 RX ORDER — SENNA AND DOCUSATE SODIUM 50; 8.6 MG/1; MG/1
2 TABLET, FILM COATED ORAL 2 TIMES DAILY
Qty: 60 TABLET | Refills: 1 | Status: SHIPPED | OUTPATIENT
Start: 2019-04-03

## 2019-04-03 RX ORDER — KETOROLAC TROMETHAMINE 30 MG/ML
15 INJECTION, SOLUTION INTRAMUSCULAR; INTRAVENOUS EVERY 6 HOURS PRN
Status: DISCONTINUED | OUTPATIENT
Start: 2019-04-03 | End: 2019-04-03 | Stop reason: HOSPADM

## 2019-04-03 RX ORDER — HYDROCHLOROTHIAZIDE 25 MG/1
12.5 TABLET ORAL EVERY MORNING
Qty: 90 TABLET | Refills: 1 | Status: SHIPPED | OUTPATIENT
Start: 2019-04-03

## 2019-04-03 RX ADMIN — SODIUM CHLORIDE, PRESERVATIVE FREE 10 ML: 5 INJECTION INTRAVENOUS at 15:12

## 2019-04-03 RX ADMIN — KETOROLAC TROMETHAMINE 15 MG: 30 INJECTION, SOLUTION INTRAMUSCULAR at 16:28

## 2019-04-03 RX ADMIN — PANTOPRAZOLE SODIUM 40 MG: 40 INJECTION, POWDER, FOR SOLUTION INTRAVENOUS at 08:51

## 2019-04-03 RX ADMIN — ENOXAPARIN SODIUM 40 MG: 40 INJECTION SUBCUTANEOUS at 08:58

## 2019-04-03 RX ADMIN — SODIUM CHLORIDE: 9 INJECTION, SOLUTION INTRAVENOUS at 02:26

## 2019-04-03 RX ADMIN — SODIUM CHLORIDE, PRESERVATIVE FREE 10 ML: 5 INJECTION INTRAVENOUS at 08:51

## 2019-04-03 RX ADMIN — ONDANSETRON 4 MG: 2 INJECTION INTRAMUSCULAR; INTRAVENOUS at 09:04

## 2019-04-03 RX ADMIN — KETOROLAC TROMETHAMINE 15 MG: 30 INJECTION, SOLUTION INTRAMUSCULAR at 10:29

## 2019-04-03 RX ADMIN — ONDANSETRON 4 MG: 2 INJECTION INTRAMUSCULAR; INTRAVENOUS at 15:12

## 2019-04-03 RX ADMIN — SODIUM CHLORIDE, PRESERVATIVE FREE 10 ML: 5 INJECTION INTRAVENOUS at 16:29

## 2019-04-03 ASSESSMENT — PAIN DESCRIPTION - PAIN TYPE: TYPE: CHRONIC PAIN

## 2019-04-03 ASSESSMENT — PAIN SCALES - GENERAL
PAINLEVEL_OUTOF10: 5
PAINLEVEL_OUTOF10: 5

## 2019-04-03 ASSESSMENT — PAIN DESCRIPTION - LOCATION: LOCATION: BACK

## 2019-04-03 NOTE — PROGRESS NOTES
Nutrition Assessment    Type and Reason for Visit: Initial, Positive Nutrition Screen(Wt loss/Decreased appetite)    Nutrition Recommendations:   · Continue general diet, free of therapeutic restrictions   · Encourage po intakes  · Obtain Actual weight   · Start Frozen ONS BID- chocolate or vanilla  · Monitor nutrition adequacy, pertinent labs, bowel habits, wt changes, and clinical progress    Nutrition Assessment: Pt is at risk for malnutrition AEB pt report of decreased appetite, wt loss, and nausea. Pt voices to have had poor po intakes over the last 3 days d/t nausea. Pt expresses feeling better today. Appetite remains low compared to baseline. Encouraged PO intakes. Pt favorable to frozen ONS. NORBERTO wt change d/t lack of actual weights in EMR. Will continue to monitor. Malnutrition Assessment:  · Malnutrition Status: At risk for malnutrition    Nutrition Risk Level: Moderate    Nutrient Needs:  · Estimated Daily Total Kcal: [id number]  · Estimated Daily Protein (g): 57-68  · Estimated Daily Total Fluid (ml/day): 1 ml/kcal or per MD    Nutrition Diagnosis:   · Problem: Inadequate oral intake  · Etiology: related to Insufficient energy/nutrient consumption     Signs and symptoms:  as evidenced by Patient report of, Diet history of poor intake, Nausea, Vomiting    Objective Information:  · Nutrition-Focused Physical Findings: +1 RLE edema. + BM 4/2. · Wound Type: None  · Current Nutrition Therapies:  · Oral Diet Orders: General   · Oral Diet intake: 1-25%  · Oral Nutrition Supplement (ONS) Orders: None  · ONS intake: Unable to assess  · Anthropometric Measures:  · Ht: 5' 5\" (165.1 cm)   · Current Body Wt: 165 lb (74.8 kg)(stated)  · % Weight Change: NORBERTO d/t lack of actual weights in EMR. fluid accumulation noted.   · Ideal Body Wt: 125 lb (56.7 kg),   · BMI Classification: BMI 25.0 - 29.9 Overweight    Nutrition Interventions:   Continue current diet, Start ONS  Continued Inpatient Monitoring    Nutrition

## 2019-04-03 NOTE — PLAN OF CARE
Nutrition Problem: Inadequate oral intake  Intervention: Food and/or Nutrient Delivery: Continue current diet, Start ONS  Nutritional Goals: Pt will consume 50% or more of meals this admission.

## 2019-04-03 NOTE — PLAN OF CARE
Pt instructed to use call light for assistance. Bed in low position, 2/4 side rails up, bed alarm refused, and call light within reach.

## 2019-04-08 NOTE — CONSULTS
Department of Orthopedic Surgery  Attending Note  Consult Note        Reason for Consult:  S/P right hip bipolar  Requesting Physician: Hector Mcclain MD  Date of Service: 4/2/2019     CHIEF COMPLAINT:  As Above    History Obtained From:  patient    HISTORY OF PRESENT ILLNESS:                The patient is a 61 y.o. female who presents with above chief complaint. Admitted with n/v and constipation   Past Medical History:        Diagnosis Date    Cancer ([address] 08/2016    ENDOMETRIAL    Hypertension     DURING ONE CHEMO [place]     Past Surgical History:        Procedure Laterality Date    HEMIARTHROPLASTY HIP Right 3/25/2019    RIGHT BIPOLAR HIP REPLACEMENT           [facility] performed by Elfreda Leventhal, MD at [address] [facility] [address] Right     STENT FROM [place]    OTHER SURGICAL HISTORY Right     ureteral stent change         Medications Prior to Admission:   Prior to Admission medications    Medication Sig Start Date End Date Taking? Authorizing Provider   ondansetron (ZOFRAN) 4 MG tablet Take 1 tablet by mouth daily as needed for Nausea or Vomiting 3/27/19   HARJINDER Carter   enoxaparin (LOVENOX) 40 MG/0.4ML injection Inject 0.4 mLs into the skin daily 3/27/19   Cristiana Call, APRN - CNP   diphenhydrAMINE (BENADRYL) 25 MG tablet Take 1 tablet by mouth every 6 hours as needed for Itching 3/26/19 4/25/19  Cristiana Call, APRN - CNP   LORazepam (ATIVAN) 0.5 MG tablet Take 0.5 mg by mouth every 6 hours as needed for Anxiety. Historical Provider, MD   NONFORMULARY daily Vitamin B complex patch    Historical Provider, MD   NONFORMULARY daily    Historical Provider, MD       Allergies:  Patient has no known allergies. Social History:    Tobacco:  reports that she has never smoked. She has never used smokeless tobacco.   Alcohol:  reports that she drinks about 0.6 oz of alcohol per week. Illicit Drug: No  Family History:   History reviewed. No pertinent family history.     REVIEW OF SYSTEMS:    CONSTITUTIONAL:  negative  MUSCULOSKELETAL:  positive for  pain    PHYSICAL EXAM:    awake, alert, cooperative, no apparent distress, and appears stated age  MUSCULOSKELETAL:  there is no redness, warmth, or swelling of the joints  full range of motion noted  motor strength is 5 out of 5 all extremities except right hip bilaterally  tone is normal    RIGHT HIP:  redness absent  warmth absent  swelling present  tenderness absent  incision site clean, dry and intact  Reduced ROM and Strenght      DATA:    CBC:   Recent Labs     04/01/19  1300 04/02/19  0545   WBC 6.9 5.2   HGB 11.2* 8.5*    250     BMP:    Recent Labs     04/01/19  1300 04/02/19  0545    140   K 4.0 3.7   CL 98* 104   CO2 24 24   BUN 13 8   CREATININE 0.8 0.6   GLUCOSE 111* 94     INR: No results for input(s): INR in the last 72 hours.     Radiology:   XR ABDOMEN (KUB) (SINGLE AP VIEW)    (Results Pending)          IMPRESSION/RECOMMENDATIONS:    Assessment: s/p right bipolar hip replacement, n/v    Plan:  1) no surgery needed at this time  Will follow   Pt doing well from our standpoint

## 2019-04-08 NOTE — DISCHARGE SUMMARY
Hospital Medicine Discharge Summary    Patient ID: Ardiyoni Solo      Patient's PCP: Jasper Ryan    Admit Date: 4/1/2019     Discharge Date: 4/3/2019      Admitting Physician: González Santillan MD     Discharge Physician: Abbi Bailey MD     Discharge Diagnoses: Active Hospital Problems    Diagnosis Date Noted    Dehydration [E86.0] 04/01/2019       The patient was seen and examined on day of discharge and this discharge summary is in conjunction with any daily progress note from day of discharge. History Of Present Illness:      61 y.o. female who presented to [facility] with his treatment of nausea and vomiting and generalized malaise. Patient just recently had a right bipolar hip replacement after being diagnosed with right pathological femoral neck fracture. Patient has a history of uterine cancer with resection. Patient was sent home on pain medications Percocet which she states normally causes moderate nausea. She was doing okay on the Percocet in the hospital with the IV Zofran but when she went home and took Percocet with oral Zofran and her nausea was unremitting. She has been unable to keep anything down for the last several days and has been very weak. She's been extremely constipated with no bowel movement over 8-9 days but did have a bowel movement today. At the urging of her family and after discussion with her surgeon Dr. Maude Gray she decided to come back to the hospital for care     Patient states that her pain in the hip has been more as she has not been able to keep much of the Percocet down and is only been taking rectal Tylenol  She's had low-grade fever        Hospital Course:      Intractable nausea and vomiting: opiates/constipation related. minimized narcotics. Started on bowel regimen. Hydrated with ivf. Slowly improved and was able to tolerate diet well before discharge.        S/p recent bipolar hip replacement       Physical Exam Performed:     BP (!)

## 2019-04-10 DIAGNOSIS — R52 PAIN: Primary | ICD-10-CM

## 2019-05-01 PROBLEM — E86.0 DEHYDRATION: Status: RESOLVED | Noted: 2019-04-01 | Resolved: 2019-05-01

## 2019-06-05 DIAGNOSIS — R52 PAIN: Primary | ICD-10-CM

## 2020-05-26 NOTE — PROGRESS NOTES
Hospitalist Progress Note      PCP: [id number] [place]    Date of Admission: 4/1/2019    Chief Complaint:  Nausea and constipation       Subjective:  Nauseated. Feeling little better. Did not try any thing so far for constipation. Medications:  Reviewed    Infusion Medications    sodium chloride 125 mL/hr at 04/02/19 0422     Scheduled Medications    traMADol  50 mg Oral Once    sennosides-docusate sodium  2 tablet Oral BID    polyethylene glycol  17 g Oral Daily    sodium phosphate  1 enema Rectal Once    sodium chloride flush  10 mL Intravenous 2 times per day    enoxaparin  40 mg Subcutaneous Daily     PRN Meds: traMADol **OR** traMADol, LORazepam, sodium chloride flush, magnesium hydroxide, ondansetron, acetaminophen      Intake/Output Summary (Last 24 hours) at 4/2/2019 0911  Last data filed at 4/1/2019 2102  Gross per 24 hour   Intake 3000 ml   Output 500 ml   Net 2500 ml       Physical Exam Performed:    BP (!) 170/99   Pulse 107   Temp 98.5 °F (36.9 °C) (Oral)   Resp 16   Ht 5' 5\" (1.651 m)   Wt 165 lb (74.8 kg)   SpO2 95%   BMI 27.46 kg/m²     General appearance: No apparent distress, appears stated age and cooperative. HEENT: Pupils equal, round, and reactive to light. Conjunctivae/corneas clear. Neck: Supple, with full range of motion. No jugular venous distention. Trachea midline. Respiratory:  Normal respiratory effort. Clear to auscultation, bilaterally without Rales/Wheezes/Rhonchi. Cardiovascular: Regular rate and rhythm with normal S1/S2 without murmurs, rubs or gallops. Abdomen: Soft, non-tender, non-distended with normal bowel sounds. Musculoskeletal: No clubbing, cyanosis or edema bilaterally. Full range of motion without deformity. Skin: Skin color, texture, turgor normal.  No rashes or lesions. Neurologic:  Neurovascularly intact without any focal sensory/motor deficits.  Cranial nerves: II-XII intact, grossly non-focal.  Psychiatric: Alert and oriented, thought content appropriate, normal insight  Capillary Refill: Brisk,< 3 seconds   Peripheral Pulses: +2 palpable, equal bilaterally       Labs:   Recent Labs     04/01/19  1300 04/02/19  0545   WBC 6.9 5.2   HGB 11.2* 8.5*   HCT 34.2* 25.6*    250     Recent Labs     04/01/19  1300 04/02/19  0545    140   K 4.0 3.7   CL 98* 104   CO2 24 24   BUN 13 8   CREATININE 0.8 0.6   CALCIUM 10.1 8.7     Recent Labs     04/01/19  1300 04/02/19  0545   AST 25 17   ALT 17 12   BILITOT 0.8 0.5   ALKPHOS 164* 113     No results for input(s): INR in the last 72 hours. No results for input(s): Garry Milch in the last 72 hours. Urinalysis:      Lab Results   Component Value Date    NITRU Negative 04/01/2019    WBCUA 6-10 04/01/2019    BACTERIA 1+ 04/01/2019    RBCUA 0-2 04/01/2019    BLOODU TRACE-INTACT 04/01/2019    SPECGRAV >=1.030 04/01/2019    GLUCOSEU Negative 04/01/2019       Radiology:  XR ABDOMEN (KUB) (SINGLE AP VIEW)    (Results Pending)           Assessment/Plan:    Active Hospital Problems    Diagnosis Date Noted    Dehydration [E86.0] 04/01/2019      intractable nausea and vomiting: opiates/constipation related. minimize narcotics. KUB. Hep panel looks ok. Enema today. Start on bowel regimen. Gentle ivf. S/p recent bipolar hip replacement: pt/ot; ortho consult          htn: holding HCTZ.   Start on BB      GERD: PPi    H/O  Uterine cancer: in remission      DVT Prophylaxis: lovenox     Diet: DIET GENERAL;  Code Status: Full Code    PT/OT Eval Status: ordered    Dispo - inpt 1 more day    Hiwot Michaels MD Albendazole Counseling:  I discussed with the patient the risks of albendazole including but not limited to cytopenia, kidney damage, nausea/vomiting and severe allergy.  The patient understands that this medication is being used in an off-label manner.

## 2023-12-06 ENCOUNTER — INPATIENT HOSPITAL (OUTPATIENT)
Dept: URBAN - METROPOLITAN AREA HOSPITAL 104 | Facility: HOSPITAL | Age: 68
End: 2023-12-06
Payer: COMMERCIAL

## 2023-12-06 DIAGNOSIS — C54.1 MALIGNANT NEOPLASM OF ENDOMETRIUM: ICD-10-CM

## 2023-12-06 DIAGNOSIS — E43 UNSPECIFIED SEVERE PROTEIN-CALORIE MALNUTRITION: ICD-10-CM

## 2023-12-06 DIAGNOSIS — I69.820 APHASIA FOLLOWING OTHER CEREBROVASCULAR DISEASE: ICD-10-CM

## 2023-12-06 PROCEDURE — 99222 1ST HOSP IP/OBS MODERATE 55: CPT | Performed by: NURSE PRACTITIONER

## 2023-12-07 PROCEDURE — 99232 SBSQ HOSP IP/OBS MODERATE 35: CPT | Performed by: NURSE PRACTITIONER

## 2023-12-08 PROCEDURE — 99232 SBSQ HOSP IP/OBS MODERATE 35: CPT | Performed by: NURSE PRACTITIONER

## 2023-12-10 ENCOUNTER — INPATIENT HOSPITAL (OUTPATIENT)
Dept: URBAN - METROPOLITAN AREA HOSPITAL 104 | Facility: HOSPITAL | Age: 68
End: 2023-12-10
Payer: COMMERCIAL

## 2023-12-10 DIAGNOSIS — E43 UNSPECIFIED SEVERE PROTEIN-CALORIE MALNUTRITION: ICD-10-CM

## 2023-12-10 DIAGNOSIS — I69.820 APHASIA FOLLOWING OTHER CEREBROVASCULAR DISEASE: ICD-10-CM

## 2023-12-10 DIAGNOSIS — C54.1 MALIGNANT NEOPLASM OF ENDOMETRIUM: ICD-10-CM

## 2023-12-10 PROCEDURE — 99232 SBSQ HOSP IP/OBS MODERATE 35: CPT | Performed by: NURSE PRACTITIONER

## 2023-12-11 ENCOUNTER — INPATIENT HOSPITAL (OUTPATIENT)
Dept: URBAN - METROPOLITAN AREA HOSPITAL 104 | Facility: HOSPITAL | Age: 68
End: 2023-12-11
Payer: COMMERCIAL

## 2023-12-11 DIAGNOSIS — E43 UNSPECIFIED SEVERE PROTEIN-CALORIE MALNUTRITION: ICD-10-CM

## 2023-12-11 PROCEDURE — 43246 EGD PLACE GASTROSTOMY TUBE: CPT | Performed by: INTERNAL MEDICINE

## 2023-12-12 ENCOUNTER — INPATIENT HOSPITAL (OUTPATIENT)
Dept: URBAN - METROPOLITAN AREA HOSPITAL 104 | Facility: HOSPITAL | Age: 68
End: 2023-12-12
Payer: COMMERCIAL

## 2023-12-12 DIAGNOSIS — Z43.1 ENCOUNTER FOR ATTENTION TO GASTROSTOMY: ICD-10-CM

## 2023-12-12 DIAGNOSIS — E43 UNSPECIFIED SEVERE PROTEIN-CALORIE MALNUTRITION: ICD-10-CM

## 2023-12-12 DIAGNOSIS — C54.1 MALIGNANT NEOPLASM OF ENDOMETRIUM: ICD-10-CM

## 2023-12-12 PROCEDURE — 99232 SBSQ HOSP IP/OBS MODERATE 35: CPT | Performed by: NURSE PRACTITIONER

## (undated) DEVICE — SUTURE VCRL SZ 1 L18IN ABSRB VLT CT-1 L36MM 1/2 CIR J741D

## (undated) DEVICE — SOLUTION IRRIG 2000ML 0.9% SOD CHL USP UROMATIC PLAS CONT

## (undated) DEVICE — SUTURE MCRYL SZ 0 L18IN ABSRB VLT L36MM CT-1 1/2 CIR Y740D

## (undated) DEVICE — SOLUTION IV IRRIG WATER 1000ML POUR BRL 2F7114

## (undated) DEVICE — STERILE LATEX POWDER-FREE SURGICAL GLOVESWITH NITRILE COATING: Brand: PROTEXIS

## (undated) DEVICE — BONE PREPARATION KIT: Brand: BIOPREP

## (undated) DEVICE — STANDARD HYPODERMIC NEEDLE,POLYPROPYLENE HUB: Brand: MONOJECT

## (undated) DEVICE — SYRINGE MED 10ML LUERLOCK TIP W/O SFTY DISP

## (undated) DEVICE — Device

## (undated) DEVICE — SMARTGOWN BREATHABLE SURGICAL GOWN: Brand: CONVERTORS

## (undated) DEVICE — SUTURE MCRYL SZ 2-0 L18IN ABSRB VLT L36MM CT-1 1/2 CIR Y739D

## (undated) DEVICE — 3 BONE CEMENT MIXER: Brand: MIXEVAC

## (undated) DEVICE — SOLUTION IV IRRIG POUR BRL 0.9% SODIUM CHL 2F7124

## (undated) DEVICE — DRESSING FOAM W4XL10IN POLYUR SAFETAC MULTILAYERED ABSRB PD

## (undated) DEVICE — HOOD, PEEL-AWAY: Brand: FLYTE

## (undated) DEVICE — SMARTGOWN SURGICAL GOWN, XL: Brand: CONVERTORS

## (undated) DEVICE — STAPLER EXT SKIN 35 WIDE S STL STPL SQUEEZE HNDL VISISTAT

## (undated) DEVICE — PENCIL SMK EVAC ALL IN 1 DSGN ENH VISIBILITY IMPROVED AIR

## (undated) DEVICE — HANDPIECE SET WITH HIGH FLOW TIP AND SUCTION TUBE: Brand: INTERPULSE